# Patient Record
Sex: MALE | Race: WHITE | Employment: FULL TIME | URBAN - METROPOLITAN AREA
[De-identification: names, ages, dates, MRNs, and addresses within clinical notes are randomized per-mention and may not be internally consistent; named-entity substitution may affect disease eponyms.]

---

## 2019-06-16 ENCOUNTER — APPOINTMENT (OUTPATIENT)
Dept: GENERAL RADIOLOGY | Age: 44
End: 2019-06-16
Attending: STUDENT IN AN ORGANIZED HEALTH CARE EDUCATION/TRAINING PROGRAM
Payer: MEDICAID

## 2019-06-16 ENCOUNTER — HOSPITAL ENCOUNTER (EMERGENCY)
Age: 44
Discharge: HOME | End: 2019-06-16
Attending: STUDENT IN AN ORGANIZED HEALTH CARE EDUCATION/TRAINING PROGRAM
Payer: MEDICAID

## 2019-06-16 VITALS
BODY MASS INDEX: 31.86 KG/M2 | DIASTOLIC BLOOD PRESSURE: 96 MMHG | TEMPERATURE: 98.5 F | OXYGEN SATURATION: 93 % | HEART RATE: 96 BPM | WEIGHT: 203 LBS | HEIGHT: 67 IN | RESPIRATION RATE: 16 BRPM | SYSTOLIC BLOOD PRESSURE: 173 MMHG

## 2019-06-16 DIAGNOSIS — S39.012A LUMBAR STRAIN, INITIAL ENCOUNTER: Primary | ICD-10-CM

## 2019-06-16 PROCEDURE — 99282 EMERGENCY DEPT VISIT SF MDM: CPT

## 2019-06-16 PROCEDURE — 74011250637 HC RX REV CODE- 250/637: Performed by: STUDENT IN AN ORGANIZED HEALTH CARE EDUCATION/TRAINING PROGRAM

## 2019-06-16 PROCEDURE — 74011636637 HC RX REV CODE- 636/637: Performed by: STUDENT IN AN ORGANIZED HEALTH CARE EDUCATION/TRAINING PROGRAM

## 2019-06-16 PROCEDURE — 72100 X-RAY EXAM L-S SPINE 2/3 VWS: CPT

## 2019-06-16 RX ORDER — LORAZEPAM 0.5 MG/1
1 TABLET ORAL
Qty: 20 TAB | Refills: 0 | Status: SHIPPED | OUTPATIENT
Start: 2019-06-16 | End: 2020-02-11

## 2019-06-16 RX ORDER — LORAZEPAM 1 MG/1
1 TABLET ORAL
Status: COMPLETED | OUTPATIENT
Start: 2019-06-16 | End: 2019-06-16

## 2019-06-16 RX ORDER — PREDNISONE 50 MG/1
50 TABLET ORAL DAILY
Qty: 3 TAB | Refills: 0 | Status: SHIPPED | OUTPATIENT
Start: 2019-06-16 | End: 2019-06-19

## 2019-06-16 RX ORDER — PREDNISONE 20 MG/1
60 TABLET ORAL ONCE
Status: COMPLETED | OUTPATIENT
Start: 2019-06-16 | End: 2019-06-16

## 2019-06-16 RX ADMIN — PREDNISONE 60 MG: 20 TABLET ORAL at 14:49

## 2019-06-16 RX ADMIN — LORAZEPAM 1 MG: 1 TABLET ORAL at 14:49

## 2019-06-16 NOTE — ED PROVIDER NOTES
37 y.o. male with past medical history significant for pancreatitis, diabetes, and lyme disease who presents from home with chief complaint of back pain. Patient states ~2 days ago he was at work \"slinging tires into a truck\" when he had sudden onset of back pain. Patient was seen by a Doctor ~2 days ago, was given a shot of Tramadol and discharged home with Flexeril that provided no relief. Patient presents to [facility] ED with worsening back pain and accompanying leg pain, hip pain, leg swelling, and leg \"tingling. \" Patient endorses a hx of back problems. Patient endorses hx of GLF. Patient denies numbness. There are no other acute medical concerns at this time. Social hx: (+) Tobacco use (Former), (+) EtOH use, No illicit drug use. PCP: Afsaneh, MD Ryan    Note written by Era Cortez, as dictated by Mmoo Baron MD 1:52 PM       The history is provided by the patient. No  was used.         Past Medical History:   Diagnosis Date    Diabetes ([address]     Hernia, abdominal     Hypercholesteremia     Lyme disease     Pancreatitis        Past Surgical History:   Procedure Laterality Date    HX CHOLECYSTECTOMY           Family History:   Problem Relation Age of Onset    Cancer Mother     Diabetes Mother     Hypertension Father        Social History     Socioeconomic History    Marital status: LEGALLY      Spouse name: Not on file    Number of children: Not on file    Years of education: Not on file    Highest education level: Not on file   Occupational History    Not on file   Social Needs    Financial resource strain: Not on file    Food insecurity:     Worry: Not on file     Inability: Not on file    Transportation needs:     Medical: Not on file     Non-medical: Not on file   Tobacco Use    Smoking status: Former Smoker   Substance and Sexual Activity    Alcohol use: Yes     Comment: beer    Drug use: No    Sexual activity: Not on file   Lifestyle    Physical activity:     Days per week: Not on file     Minutes per session: Not on file    Stress: Not on file   Relationships    Social connections:     Talks on phone: Not on file     Gets together: Not on file     Attends Scientology service: Not on file     Active member of club or organization: Not on file     Attends meetings of clubs or organizations: Not on file     Relationship status: Not on file    Intimate partner violence:     Fear of current or ex partner: Not on file     Emotionally abused: Not on file     Physically abused: Not on file     Forced sexual activity: Not on file   Other Topics Concern    Not on file   Social History Narrative    Not on file         ALLERGIES: Patient has no known allergies. Review of Systems   Constitutional: Negative for chills, diaphoresis, fatigue and fever. HENT: Negative for congestion, rhinorrhea, sinus pressure, sore throat, trouble swallowing and voice change. Eyes: Negative for photophobia and visual disturbance. Respiratory: Negative for cough, chest tightness and shortness of breath. Cardiovascular: Positive for leg swelling. Negative for chest pain and palpitations. Gastrointestinal: Negative for abdominal pain, blood in stool, constipation, diarrhea, nausea and vomiting. Musculoskeletal: Positive for back pain. Negative for arthralgias, myalgias and neck pain. Leg pain. Hip pain. Neurological: Negative for dizziness, weakness, light-headedness, numbness and headaches. All other systems reviewed and are negative. There were no vitals filed for this visit. Physical Exam   Constitutional: He is oriented to person, place, and time. He appears well-developed and well-nourished. No distress. HENT:   Head: Normocephalic. Nose: Nose normal.   Mouth/Throat: Oropharynx is clear and moist. No oropharyngeal exudate. Eyes: Conjunctivae and EOM are normal. Right eye exhibits no discharge. Left eye exhibits no discharge.  No scleral icterus. Neck: Normal range of motion. Neck supple. No JVD present. No tracheal deviation present. No thyromegaly present. Cardiovascular: Normal rate, regular rhythm, normal heart sounds and intact distal pulses. Exam reveals no gallop and no friction rub. No murmur heard. Pulmonary/Chest: Effort normal and breath sounds normal. No stridor. No respiratory distress. He has no wheezes. He has no rales. He exhibits no tenderness. Abdominal: Bowel sounds are normal. He exhibits no distension and no mass. There is no tenderness. There is no rebound. Musculoskeletal: Normal range of motion. He exhibits no edema. Right upper spinal tenderness, mild L spine tenderness. Right paraspinal tenderness with palpation. Lymphadenopathy:     He has no cervical adenopathy. Neurological: He is alert and oriented to person, place, and time. No cranial nerve deficit. Coordination normal.   Skin: Skin is warm and dry. No rash noted. He is not diaphoretic. No erythema. No pallor. Psychiatric: He has a normal mood and affect. His behavior is normal. Judgment and thought content normal.   Nursing note and vitals reviewed. Note written by Era Le, as dictated by Jacques Willingham MD 1:52 PM       MDM  Number of Diagnoses or Management Options  Lumbar strain, initial encounter:   Diagnosis management comments: A/P:  Lumbar strain. 38 y/o with unremarkable PE. Pt without leg weakness or sensory changes. No red flag symptoms. Xray lumbar spine, steroids and muscle relaxers.        Amount and/or Complexity of Data Reviewed  Tests in the radiology section of CPT®: reviewed and ordered  Independent visualization of images, tracings, or specimens: yes    Risk of Complications, Morbidity, and/or Mortality  Presenting problems: low  Diagnostic procedures: low  Management options: low    Patient Progress  Patient progress: improved         Procedures

## 2019-06-16 NOTE — DISCHARGE INSTRUCTIONS

## 2019-06-16 NOTE — ED TRIAGE NOTES
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 Patient states he saw a doctor on Friday and was given flexeril without relief. Per patient pain is on left side of lower back and radiates down leg and into testicle.

## 2019-06-21 ENCOUNTER — HOSPITAL ENCOUNTER (EMERGENCY)
Age: 44
Discharge: HOME | DRG: 383 | End: 2019-06-22
Attending: EMERGENCY MEDICINE
Payer: MEDICAID

## 2019-06-21 ENCOUNTER — APPOINTMENT (OUTPATIENT)
Dept: GENERAL RADIOLOGY | Age: 44
DRG: 383 | End: 2019-06-21
Attending: EMERGENCY MEDICINE
Payer: MEDICAID

## 2019-06-21 VITALS
OXYGEN SATURATION: 99 % | DIASTOLIC BLOOD PRESSURE: 89 MMHG | RESPIRATION RATE: 18 BRPM | SYSTOLIC BLOOD PRESSURE: 151 MMHG | TEMPERATURE: 99 F | HEART RATE: 110 BPM

## 2019-06-21 DIAGNOSIS — L03.113 RIGHT ARM CELLULITIS: Primary | ICD-10-CM

## 2019-06-21 LAB
ALBUMIN SERPL-MCNC: 4.5 G/DL (ref 3.5–5)
ALBUMIN/GLOB SERPL: 1.1 {RATIO} (ref 1.1–2.2)
ALP SERPL-CCNC: 68 U/L (ref 45–117)
ALT SERPL-CCNC: 44 U/L (ref 12–78)
ANION GAP SERPL CALC-SCNC: 9 MMOL/L (ref 5–15)
AST SERPL-CCNC: 33 U/L (ref 15–37)
BASOPHILS # BLD: 0 K/UL (ref 0–0.1)
BASOPHILS NFR BLD: 0 % (ref 0–1)
BILIRUB SERPL-MCNC: 1.4 MG/DL (ref 0.2–1)
BUN SERPL-MCNC: 16 MG/DL (ref 6–20)
BUN/CREAT SERPL: 15 (ref 12–20)
CALCIUM SERPL-MCNC: 9.4 MG/DL (ref 8.5–10.1)
CHLORIDE SERPL-SCNC: 104 MMOL/L (ref 97–108)
CK SERPL-CCNC: 120 U/L (ref 39–308)
CO2 SERPL-SCNC: 23 MMOL/L (ref 21–32)
COMMENT, HOLDF: NORMAL
CREAT SERPL-MCNC: 1.1 MG/DL (ref 0.7–1.3)
DIFFERENTIAL METHOD BLD: NORMAL
EOSINOPHIL # BLD: 0.1 K/UL (ref 0–0.4)
EOSINOPHIL NFR BLD: 1 % (ref 0–7)
ERYTHROCYTE [DISTWIDTH] IN BLOOD BY AUTOMATED COUNT: 13.4 % (ref 11.5–14.5)
GLOBULIN SER CALC-MCNC: 4.2 G/DL (ref 2–4)
GLUCOSE SERPL-MCNC: 230 MG/DL (ref 65–100)
HCT VFR BLD AUTO: 45.7 % (ref 36.6–50.3)
HGB BLD-MCNC: 16 G/DL (ref 12.1–17)
IMM GRANULOCYTES # BLD AUTO: 0 K/UL (ref 0–0.04)
IMM GRANULOCYTES NFR BLD AUTO: 0 % (ref 0–0.5)
LYMPHOCYTES # BLD: 2.6 K/UL (ref 0.8–3.5)
LYMPHOCYTES NFR BLD: 28 % (ref 12–49)
MCH RBC QN AUTO: 31.9 PG (ref 26–34)
MCHC RBC AUTO-ENTMCNC: 35 G/DL (ref 30–36.5)
MCV RBC AUTO: 91.2 FL (ref 80–99)
MONOCYTES # BLD: 0.7 K/UL (ref 0–1)
MONOCYTES NFR BLD: 8 % (ref 5–13)
NEUTS SEG # BLD: 5.9 K/UL (ref 1.8–8)
NEUTS SEG NFR BLD: 63 % (ref 32–75)
NRBC # BLD: 0 K/UL (ref 0–0.01)
NRBC BLD-RTO: 0 PER 100 WBC
PLATELET # BLD AUTO: 274 K/UL (ref 150–400)
PMV BLD AUTO: 9.8 FL (ref 8.9–12.9)
POTASSIUM SERPL-SCNC: 3.5 MMOL/L (ref 3.5–5.1)
PROT SERPL-MCNC: 8.7 G/DL (ref 6.4–8.2)
RBC # BLD AUTO: 5.01 M/UL (ref 4.1–5.7)
SAMPLES BEING HELD,HOLD: NORMAL
SODIUM SERPL-SCNC: 136 MMOL/L (ref 136–145)
WBC # BLD AUTO: 9.4 K/UL (ref 4.1–11.1)

## 2019-06-21 PROCEDURE — 99282 EMERGENCY DEPT VISIT SF MDM: CPT

## 2019-06-21 PROCEDURE — 73090 X-RAY EXAM OF FOREARM: CPT

## 2019-06-21 PROCEDURE — 74011250636 HC RX REV CODE- 250/636: Performed by: EMERGENCY MEDICINE

## 2019-06-21 PROCEDURE — 96374 THER/PROPH/DIAG INJ IV PUSH: CPT

## 2019-06-21 PROCEDURE — 80053 COMPREHEN METABOLIC PANEL: CPT

## 2019-06-21 PROCEDURE — 74011250637 HC RX REV CODE- 250/637: Performed by: EMERGENCY MEDICINE

## 2019-06-21 PROCEDURE — 36415 COLL VENOUS BLD VENIPUNCTURE: CPT

## 2019-06-21 PROCEDURE — 82550 ASSAY OF CK (CPK): CPT

## 2019-06-21 PROCEDURE — 85025 COMPLETE CBC W/AUTO DIFF WBC: CPT

## 2019-06-21 RX ORDER — ACETAMINOPHEN 500 MG
1000 TABLET ORAL
Status: COMPLETED | OUTPATIENT
Start: 2019-06-21 | End: 2019-06-21

## 2019-06-21 RX ORDER — METFORMIN HYDROCHLORIDE 500 MG/1
500 TABLET ORAL 2 TIMES DAILY WITH MEALS
Qty: 60 TAB | Refills: 2 | Status: SHIPPED | OUTPATIENT
Start: 2019-06-21 | End: 2020-02-11 | Stop reason: SDUPTHER

## 2019-06-21 RX ORDER — KETOROLAC TROMETHAMINE 30 MG/ML
30 INJECTION, SOLUTION INTRAMUSCULAR; INTRAVENOUS
Status: COMPLETED | OUTPATIENT
Start: 2019-06-21 | End: 2019-06-21

## 2019-06-21 RX ORDER — NAPROXEN 500 MG/1
500 TABLET ORAL 2 TIMES DAILY WITH MEALS
Qty: 20 TAB | Refills: 0 | Status: SHIPPED | OUTPATIENT
Start: 2019-06-21 | End: 2019-07-01

## 2019-06-21 RX ORDER — DOXYCYCLINE HYCLATE 100 MG
100 TABLET ORAL 2 TIMES DAILY
Qty: 20 TAB | Refills: 0 | Status: SHIPPED | OUTPATIENT
Start: 2019-06-21 | End: 2019-06-25

## 2019-06-21 RX ADMIN — SODIUM CHLORIDE 1000 ML: 900 INJECTION, SOLUTION INTRAVENOUS at 23:23

## 2019-06-21 RX ADMIN — SODIUM CHLORIDE 1000 ML: 900 INJECTION, SOLUTION INTRAVENOUS at 23:27

## 2019-06-21 RX ADMIN — ACETAMINOPHEN 1000 MG: 500 TABLET ORAL at 23:24

## 2019-06-21 RX ADMIN — KETOROLAC TROMETHAMINE 30 MG: 30 INJECTION, SOLUTION INTRAMUSCULAR at 23:24

## 2019-06-22 PROCEDURE — 74011250637 HC RX REV CODE- 250/637: Performed by: EMERGENCY MEDICINE

## 2019-06-22 RX ORDER — DOXYCYCLINE HYCLATE 100 MG
100 TABLET ORAL
Status: COMPLETED | OUTPATIENT
Start: 2019-06-22 | End: 2019-06-22

## 2019-06-22 RX ADMIN — DOXYCYCLINE HYCLATE 100 MG: 100 TABLET, COATED ORAL at 00:26

## 2019-06-22 NOTE — ED PROVIDER NOTES
37 y.o. male with past medical history significant for DM, Pancreatitis, and HLD who presents ambulatory to the ED, with chief complaint of 6/10 proximal R forearm pain, sudden onset this 1100 hours today, with associated redness, swelling, and radiation of pain to fingers and lower bicep. 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 \" He also reports HA (2nd to lack of food today) and hx of abscesses, but denies fever/chills and known hx of MRSA. He also notes that his blood sugar readings have been in the 400s; he has not been taking his Metformin, noting that he can't afford it. There are no other acute medical concerns at this time. Negative Tobacco use (former smoker); Positive EtOH use; Negative Illicit Drug Abuse       PCP: Sophia Aguirre NP    Note written by Era Rodriguez, as dictated by Rimma Chun MD 10:03 PM     The history is provided by the patient and medical records. No  was used. Past Medical History:   Diagnosis Date    Diabetes ([address]     Hernia, abdominal     Hypercholesteremia     Lyme disease     Pancreatitis        Past Surgical History:   Procedure Laterality Date    HX CHOLECYSTECTOMY           Family History:   Problem Relation Age of Onset    Cancer Mother     Diabetes Mother     Hypertension Father        Social History     Socioeconomic History    Marital status:      Spouse name: Not on file    Number of children: Not on file    Years of education: Not on file    Highest education level: Not on file   Occupational History    Not on file   Social Needs    Financial resource strain: Not on file    Food insecurity:     Worry: Not on file     Inability: Not on file    Transportation needs:     Medical: Not on file     Non-medical: Not on file   Tobacco Use    Smoking status: Former Smoker   Substance and Sexual Activity    Alcohol use:  Yes Comment: beer    Drug use: No    Sexual activity: Not on file   Lifestyle    Physical activity:     Days per week: Not on file     Minutes per session: Not on file    Stress: Not on file   Relationships    Social connections:     Talks on phone: Not on file     Gets together: Not on file     Attends Voodoo service: Not on file     Active member of club or organization: Not on file     Attends meetings of clubs or organizations: Not on file     Relationship status: Not on file    Intimate partner violence:     Fear of current or ex partner: Not on file     Emotionally abused: Not on file     Physically abused: Not on file     Forced sexual activity: Not on file   Other Topics Concern    Not on file   Social History Narrative    Not on file         ALLERGIES: Patient has no known allergies. Review of Systems   Constitutional: Negative for appetite change and fever. HENT: Negative for congestion, nosebleeds and sore throat. Eyes: Negative for discharge. Respiratory: Negative for cough and shortness of breath. Cardiovascular: Negative for chest pain. Gastrointestinal: Negative for abdominal pain, diarrhea, nausea and vomiting. Genitourinary: Negative for dysuria. Musculoskeletal: Positive for myalgias. Skin: Positive for color change. Negative for rash. Neurological: Positive for headaches. Negative for weakness. Hematological: Negative for adenopathy. Psychiatric/Behavioral: Negative. All other systems reviewed and are negative. Vitals:    06/21/19 2001 06/21/19 2023   BP:  151/89   Pulse: (!) 122 (!) 110   Resp:  18   Temp:  99 °F (37.2 °C)   SpO2: 97% 99%            Physical Exam   Constitutional: He is oriented to person, place, and time. He appears well-developed and well-nourished. HENT:   Head: Normocephalic and atraumatic. Mouth/Throat: Oropharynx is clear and moist.   Eyes: Conjunctivae are normal.   Neck: Normal range of motion. Neck supple.    Cardiovascular: Normal rate, regular rhythm and normal heart sounds. Pulmonary/Chest: Effort normal and breath sounds normal.   Abdominal: Soft. Bowel sounds are normal. There is no tenderness. Musculoskeletal: Normal range of motion. He exhibits no edema or tenderness. Neurological: He is alert and oriented to person, place, and time. Skin: Skin is warm and dry. R forearm has a circular (4cm circumference) area that is mildly erythematous, ttp and mildly warm. No fluctuance. Psychiatric: He has a normal mood and affect. His behavior is normal.   Nursing note and vitals reviewed. Note written by Yehuda Jeans, Scribe, as dictated by Yue Kim MD 10:03 PM     MDM       Procedures       A/P:  1. Cellulitis - doxycycline. Close follow-up. Patient's results have been reviewed with them. Patient and/or family have verbally conveyed their understanding and agreement of the patient's signs, symptoms, diagnosis, treatment and prognosis and additionally agree to follow up as recommended or return to the Emergency Room should their condition change prior to follow-up. Discharge instructions have also been provided to the patient with some educational information regarding their diagnosis as well a list of reasons why they would want to return to the ER prior to their follow-up appointment should their condition change.

## 2019-06-22 NOTE — ED NOTES
Patient arrives ambulatory from home with warm, tender area to right forearm. Unsure how long its been present, but states he has a cut on same forearm.

## 2019-06-22 NOTE — DISCHARGE INSTRUCTIONS
- Doxycycline as prescribed. - Warm, wet, compresses at least four times a day. - Ibuprofen as needed for pain. You may also take Acetaminophen. - Resume your Meformin.  - Return to ED for fever >100.5F, increased pain, increased redness, increased swelling, any other concerns. Patient Education        Cellulitis: Care Instructions  Your Care Instructions    Cellulitis is a skin infection caused by bacteria, most often strep or staph. It often occurs after a break in the skin from a scrape, cut, bite, or puncture, or after a rash. Cellulitis may be treated without doing tests to find out what caused it. But your doctor may do tests, if needed, to look for a specific bacteria, like methicillin-resistant Staphylococcus aureus (MRSA). The doctor has checked you carefully, but problems can develop later. If you notice any problems or new symptoms, get medical treatment right away. Follow-up care is a key part of your treatment and safety. Be sure to make and go to all appointments, and call your doctor if you are having problems. It's also a good idea to know your test results and keep a list of the medicines you take. How can you care for yourself at home? · Take your antibiotics as directed. Do not stop taking them just because you feel better. You need to take the full course of antibiotics. · Prop up the infected area on pillows to reduce pain and swelling. Try to keep the area above the level of your heart as often as you can. · If your doctor told you how to care for your wound, follow your doctor's instructions. If you did not get instructions, follow this general advice:  ? Wash the wound with clean water 2 times a day. Don't use hydrogen peroxide or alcohol, which can slow healing. ? You may cover the wound with a thin layer of petroleum jelly, such as Vaseline, and a nonstick bandage. ? Apply more petroleum jelly and replace the bandage as needed. · Be safe with medicines.  Take pain medicines exactly as directed. ? If the doctor gave you a prescription medicine for pain, take it as prescribed. ? If you are not taking a prescription pain medicine, ask your doctor if you can take an over-the-counter medicine. To prevent cellulitis in the future  · Try to prevent cuts, scrapes, or other injuries to your skin. Cellulitis most often occurs where there is a break in the skin. · If you get a scrape, cut, mild burn, or bite, wash the wound with clean water as soon as you can to help avoid infection. Don't use hydrogen peroxide or alcohol, which can slow healing. · If you have swelling in your legs (edema), support stockings and good skin care may help prevent leg sores and cellulitis. · Take care of your feet, especially if you have diabetes or other conditions that increase the risk of infection. Wear shoes and socks. Do not go barefoot. If you have athlete's foot or other skin problems on your feet, talk to your doctor about how to treat them. When should you call for help? Call your doctor now or seek immediate medical care if:    · You have signs that your infection is getting worse, such as:  ? Increased pain, swelling, warmth, or redness. ? Red streaks leading from the area. ? Pus draining from the area. ? A fever.     · You get a rash.    Watch closely for changes in your health, and be sure to contact your doctor if:    · You do not get better as expected. Where can you learn more? Go to http://mat-norma.info/. Cheryl Daily in the search box to learn more about \"Cellulitis: Care Instructions. \"  Current as of: April 17, 2018  Content Version: 11.9  © 3469-0451 [employer]. Care instructions adapted under license by [employer] (which disclaims liability or warranty for this information).  If you have questions about a medical condition or this instruction, always ask your healthcare professional. [address] any warranty or liability for your use of this information.

## 2019-06-23 ENCOUNTER — APPOINTMENT (OUTPATIENT)
Dept: CT IMAGING | Age: 44
DRG: 383 | End: 2019-06-23
Attending: EMERGENCY MEDICINE
Payer: MEDICAID

## 2019-06-23 ENCOUNTER — HOSPITAL ENCOUNTER (INPATIENT)
Age: 44
LOS: 2 days | Discharge: HOME | DRG: 383 | End: 2019-06-25
Attending: EMERGENCY MEDICINE | Admitting: INTERNAL MEDICINE
Payer: MEDICAID

## 2019-06-23 DIAGNOSIS — L03.113 CELLULITIS OF RIGHT UPPER EXTREMITY: Primary | ICD-10-CM

## 2019-06-23 PROBLEM — L03.90 CELLULITIS: Status: ACTIVE | Noted: 2019-06-23

## 2019-06-23 LAB
ANION GAP SERPL CALC-SCNC: 7 MMOL/L (ref 5–15)
BASOPHILS # BLD: 0.1 K/UL (ref 0–0.1)
BASOPHILS NFR BLD: 1 % (ref 0–1)
BUN SERPL-MCNC: 9 MG/DL (ref 6–20)
BUN/CREAT SERPL: 10 (ref 12–20)
CALCIUM SERPL-MCNC: 9.5 MG/DL (ref 8.5–10.1)
CHLORIDE SERPL-SCNC: 103 MMOL/L (ref 97–108)
CO2 SERPL-SCNC: 28 MMOL/L (ref 21–32)
COMMENT, HOLDF: NORMAL
CREAT SERPL-MCNC: 0.93 MG/DL (ref 0.7–1.3)
DIFFERENTIAL METHOD BLD: NORMAL
EOSINOPHIL # BLD: 0.1 K/UL (ref 0–0.4)
EOSINOPHIL NFR BLD: 2 % (ref 0–7)
ERYTHROCYTE [DISTWIDTH] IN BLOOD BY AUTOMATED COUNT: 13.2 % (ref 11.5–14.5)
GLUCOSE SERPL-MCNC: 228 MG/DL (ref 65–100)
HCT VFR BLD AUTO: 41 % (ref 36.6–50.3)
HGB BLD-MCNC: 14.3 G/DL (ref 12.1–17)
IMM GRANULOCYTES # BLD AUTO: 0 K/UL (ref 0–0.04)
IMM GRANULOCYTES NFR BLD AUTO: 0 % (ref 0–0.5)
LYMPHOCYTES # BLD: 2.5 K/UL (ref 0.8–3.5)
LYMPHOCYTES NFR BLD: 44 % (ref 12–49)
MCH RBC QN AUTO: 31.8 PG (ref 26–34)
MCHC RBC AUTO-ENTMCNC: 34.9 G/DL (ref 30–36.5)
MCV RBC AUTO: 91.1 FL (ref 80–99)
MONOCYTES # BLD: 0.4 K/UL (ref 0–1)
MONOCYTES NFR BLD: 7 % (ref 5–13)
NEUTS SEG # BLD: 2.6 K/UL (ref 1.8–8)
NEUTS SEG NFR BLD: 46 % (ref 32–75)
NRBC # BLD: 0 K/UL (ref 0–0.01)
NRBC BLD-RTO: 0 PER 100 WBC
PLATELET # BLD AUTO: 231 K/UL (ref 150–400)
PMV BLD AUTO: 10.2 FL (ref 8.9–12.9)
POTASSIUM SERPL-SCNC: 4.1 MMOL/L (ref 3.5–5.1)
RBC # BLD AUTO: 4.5 M/UL (ref 4.1–5.7)
SAMPLES BEING HELD,HOLD: NORMAL
SODIUM SERPL-SCNC: 138 MMOL/L (ref 136–145)
WBC # BLD AUTO: 5.7 K/UL (ref 4.1–11.1)

## 2019-06-23 PROCEDURE — 74011250636 HC RX REV CODE- 250/636: Performed by: EMERGENCY MEDICINE

## 2019-06-23 PROCEDURE — 74011636320 HC RX REV CODE- 636/320

## 2019-06-23 PROCEDURE — 74011250636 HC RX REV CODE- 250/636

## 2019-06-23 PROCEDURE — 80048 BASIC METABOLIC PNL TOTAL CA: CPT

## 2019-06-23 PROCEDURE — 99283 EMERGENCY DEPT VISIT LOW MDM: CPT

## 2019-06-23 PROCEDURE — 65270000029 HC RM PRIVATE

## 2019-06-23 PROCEDURE — 36415 COLL VENOUS BLD VENIPUNCTURE: CPT

## 2019-06-23 PROCEDURE — 74011000258 HC RX REV CODE- 258

## 2019-06-23 PROCEDURE — 85025 COMPLETE CBC W/AUTO DIFF WBC: CPT

## 2019-06-23 PROCEDURE — 73201 CT UPPER EXTREMITY W/DYE: CPT

## 2019-06-23 RX ORDER — ACETAMINOPHEN 325 MG/1
650 TABLET ORAL
Status: DISCONTINUED | OUTPATIENT
Start: 2019-06-23 | End: 2019-06-25 | Stop reason: HOSPADM

## 2019-06-23 RX ORDER — KETOROLAC TROMETHAMINE 30 MG/ML
15 INJECTION, SOLUTION INTRAMUSCULAR; INTRAVENOUS
Status: DISCONTINUED | OUTPATIENT
Start: 2019-06-23 | End: 2019-06-25 | Stop reason: HOSPADM

## 2019-06-23 RX ORDER — VANCOMYCIN 2 GRAM/500 ML IN 0.9 % SODIUM CHLORIDE INTRAVENOUS
2000
Status: COMPLETED | OUTPATIENT
Start: 2019-06-23 | End: 2019-06-23

## 2019-06-23 RX ORDER — SODIUM CHLORIDE 9 MG/ML
1000 INJECTION, SOLUTION INTRAVENOUS ONCE
Status: COMPLETED | OUTPATIENT
Start: 2019-06-23 | End: 2019-06-23

## 2019-06-23 RX ORDER — SODIUM CHLORIDE, SODIUM LACTATE, POTASSIUM CHLORIDE, CALCIUM CHLORIDE 600; 310; 30; 20 MG/100ML; MG/100ML; MG/100ML; MG/100ML
100 INJECTION, SOLUTION INTRAVENOUS CONTINUOUS
Status: DISCONTINUED | OUTPATIENT
Start: 2019-06-23 | End: 2019-06-25 | Stop reason: HOSPADM

## 2019-06-23 RX ORDER — VANCOMYCIN/0.9 % SOD CHLORIDE 1.5G/250ML
1500 PLASTIC BAG, INJECTION (ML) INTRAVENOUS EVERY 12 HOURS
Status: DISCONTINUED | OUTPATIENT
Start: 2019-06-24 | End: 2019-06-25 | Stop reason: HOSPADM

## 2019-06-23 RX ORDER — SODIUM CHLORIDE 0.9 % (FLUSH) 0.9 %
10 SYRINGE (ML) INJECTION
Status: COMPLETED | OUTPATIENT
Start: 2019-06-23 | End: 2019-06-23

## 2019-06-23 RX ORDER — KETOROLAC TROMETHAMINE 30 MG/ML
15 INJECTION, SOLUTION INTRAMUSCULAR; INTRAVENOUS
Status: COMPLETED | OUTPATIENT
Start: 2019-06-23 | End: 2019-06-23

## 2019-06-23 RX ORDER — HYDROMORPHONE HYDROCHLORIDE 1 MG/ML
1 INJECTION, SOLUTION INTRAMUSCULAR; INTRAVENOUS; SUBCUTANEOUS
Status: COMPLETED | OUTPATIENT
Start: 2019-06-23 | End: 2019-06-23

## 2019-06-23 RX ORDER — HYDROMORPHONE HYDROCHLORIDE 1 MG/ML
1 INJECTION, SOLUTION INTRAMUSCULAR; INTRAVENOUS; SUBCUTANEOUS
Status: DISCONTINUED | OUTPATIENT
Start: 2019-06-23 | End: 2019-06-25 | Stop reason: HOSPADM

## 2019-06-23 RX ORDER — ONDANSETRON 2 MG/ML
INJECTION INTRAMUSCULAR; INTRAVENOUS
Status: COMPLETED
Start: 2019-06-23 | End: 2019-06-23

## 2019-06-23 RX ORDER — SODIUM CHLORIDE 0.9 % (FLUSH) 0.9 %
SYRINGE (ML) INJECTION
Status: COMPLETED
Start: 2019-06-23 | End: 2019-06-23

## 2019-06-23 RX ORDER — ONDANSETRON 2 MG/ML
4 INJECTION INTRAMUSCULAR; INTRAVENOUS
Status: COMPLETED | OUTPATIENT
Start: 2019-06-23 | End: 2019-06-23

## 2019-06-23 RX ORDER — SODIUM CHLORIDE 9 MG/ML
INJECTION, SOLUTION INTRAVENOUS
Status: COMPLETED
Start: 2019-06-23 | End: 2019-06-23

## 2019-06-23 RX ADMIN — KETOROLAC TROMETHAMINE 15 MG: 30 INJECTION, SOLUTION INTRAMUSCULAR at 21:54

## 2019-06-23 RX ADMIN — HYDROMORPHONE HYDROCHLORIDE 1 MG: 1 INJECTION, SOLUTION INTRAMUSCULAR; INTRAVENOUS; SUBCUTANEOUS at 21:54

## 2019-06-23 RX ADMIN — ONDANSETRON 4 MG: 2 INJECTION INTRAMUSCULAR; INTRAVENOUS at 22:01

## 2019-06-23 RX ADMIN — Medication 10 ML: at 21:36

## 2019-06-23 RX ADMIN — SODIUM CHLORIDE 100 ML: 900 INJECTION, SOLUTION INTRAVENOUS at 21:48

## 2019-06-23 RX ADMIN — IOPAMIDOL 100 ML: 612 INJECTION, SOLUTION INTRAVENOUS at 21:36

## 2019-06-23 RX ADMIN — VANCOMYCIN HYDROCHLORIDE 2000 MG: 10 INJECTION, POWDER, LYOPHILIZED, FOR SOLUTION INTRAVENOUS at 21:45

## 2019-06-23 RX ADMIN — SODIUM CHLORIDE 1000 ML: 900 INJECTION, SOLUTION INTRAVENOUS at 21:54

## 2019-06-24 ENCOUNTER — APPOINTMENT (OUTPATIENT)
Dept: VASCULAR SURGERY | Age: 44
DRG: 383 | End: 2019-06-24
Attending: INTERNAL MEDICINE
Payer: MEDICAID

## 2019-06-24 LAB
AMPHET UR QL SCN: NEGATIVE
ANION GAP SERPL CALC-SCNC: 8 MMOL/L (ref 5–15)
BARBITURATES UR QL SCN: NEGATIVE
BASOPHILS # BLD: 0 K/UL (ref 0–0.1)
BASOPHILS NFR BLD: 0 % (ref 0–1)
BENZODIAZ UR QL: POSITIVE
BUN SERPL-MCNC: 9 MG/DL (ref 6–20)
BUN/CREAT SERPL: 12 (ref 12–20)
CALCIUM SERPL-MCNC: 8.5 MG/DL (ref 8.5–10.1)
CANNABINOIDS UR QL SCN: NEGATIVE
CHLORIDE SERPL-SCNC: 106 MMOL/L (ref 97–108)
CHOLEST SERPL-MCNC: 123 MG/DL
CO2 SERPL-SCNC: 24 MMOL/L (ref 21–32)
COCAINE UR QL SCN: NEGATIVE
COMMENT, HOLDF: NORMAL
COMMENT, HOLDF: NORMAL
CREAT SERPL-MCNC: 0.75 MG/DL (ref 0.7–1.3)
CRP SERPL-MCNC: 0.97 MG/DL (ref 0–0.6)
DIFFERENTIAL METHOD BLD: ABNORMAL
DRUG SCRN COMMENT,DRGCM: ABNORMAL
EOSINOPHIL # BLD: 0.1 K/UL (ref 0–0.4)
EOSINOPHIL NFR BLD: 2 % (ref 0–7)
ERYTHROCYTE [DISTWIDTH] IN BLOOD BY AUTOMATED COUNT: 12.9 % (ref 11.5–14.5)
ERYTHROCYTE [SEDIMENTATION RATE] IN BLOOD: 15 MM/HR (ref 0–15)
EST. AVERAGE GLUCOSE BLD GHB EST-MCNC: 174 MG/DL
GLUCOSE BLD STRIP.AUTO-MCNC: 153 MG/DL (ref 65–100)
GLUCOSE BLD STRIP.AUTO-MCNC: 164 MG/DL (ref 65–100)
GLUCOSE BLD STRIP.AUTO-MCNC: 198 MG/DL (ref 65–100)
GLUCOSE BLD STRIP.AUTO-MCNC: 214 MG/DL (ref 65–100)
GLUCOSE SERPL-MCNC: 207 MG/DL (ref 65–100)
HBA1C MFR BLD: 7.7 % (ref 4.2–6.3)
HCT VFR BLD AUTO: 36.4 % (ref 36.6–50.3)
HDLC SERPL-MCNC: 31 MG/DL
HDLC SERPL: 4 {RATIO} (ref 0–5)
HGB BLD-MCNC: 12.7 G/DL (ref 12.1–17)
IMM GRANULOCYTES # BLD AUTO: 0 K/UL (ref 0–0.04)
IMM GRANULOCYTES NFR BLD AUTO: 0 % (ref 0–0.5)
LDLC SERPL CALC-MCNC: 31.2 MG/DL (ref 0–100)
LIPID PROFILE,FLP: ABNORMAL
LYMPHOCYTES # BLD: 2 K/UL (ref 0.8–3.5)
LYMPHOCYTES NFR BLD: 38 % (ref 12–49)
MAGNESIUM SERPL-MCNC: 1.8 MG/DL (ref 1.6–2.4)
MCH RBC QN AUTO: 32.2 PG (ref 26–34)
MCHC RBC AUTO-ENTMCNC: 34.9 G/DL (ref 30–36.5)
MCV RBC AUTO: 92.2 FL (ref 80–99)
METHADONE UR QL: NEGATIVE
MONOCYTES # BLD: 0.3 K/UL (ref 0–1)
MONOCYTES NFR BLD: 5 % (ref 5–13)
NEUTS SEG # BLD: 2.9 K/UL (ref 1.8–8)
NEUTS SEG NFR BLD: 55 % (ref 32–75)
NRBC # BLD: 0 K/UL (ref 0–0.01)
NRBC BLD-RTO: 0 PER 100 WBC
OPIATES UR QL: POSITIVE
PCP UR QL: NEGATIVE
PHOSPHATE SERPL-MCNC: 3.7 MG/DL (ref 2.6–4.7)
PLATELET # BLD AUTO: 194 K/UL (ref 150–400)
PMV BLD AUTO: 10 FL (ref 8.9–12.9)
POTASSIUM SERPL-SCNC: 3.8 MMOL/L (ref 3.5–5.1)
RBC # BLD AUTO: 3.95 M/UL (ref 4.1–5.7)
SAMPLES BEING HELD,HOLD: NORMAL
SAMPLES BEING HELD,HOLD: NORMAL
SERVICE CMNT-IMP: ABNORMAL
SODIUM SERPL-SCNC: 138 MMOL/L (ref 136–145)
TRIGL SERPL-MCNC: 304 MG/DL (ref ?–150)
VLDLC SERPL CALC-MCNC: 60.8 MG/DL
WBC # BLD AUTO: 5.2 K/UL (ref 4.1–11.1)

## 2019-06-24 PROCEDURE — 74011000258 HC RX REV CODE- 258: Performed by: INTERNAL MEDICINE

## 2019-06-24 PROCEDURE — 80307 DRUG TEST PRSMV CHEM ANLYZR: CPT

## 2019-06-24 PROCEDURE — 74011636637 HC RX REV CODE- 636/637: Performed by: INTERNAL MEDICINE

## 2019-06-24 PROCEDURE — 83036 HEMOGLOBIN GLYCOSYLATED A1C: CPT

## 2019-06-24 PROCEDURE — 36415 COLL VENOUS BLD VENIPUNCTURE: CPT

## 2019-06-24 PROCEDURE — 65270000032 HC RM SEMIPRIVATE

## 2019-06-24 PROCEDURE — 74011250636 HC RX REV CODE- 250/636: Performed by: INTERNAL MEDICINE

## 2019-06-24 PROCEDURE — 74011250636 HC RX REV CODE- 250/636: Performed by: FAMILY MEDICINE

## 2019-06-24 PROCEDURE — 85652 RBC SED RATE AUTOMATED: CPT

## 2019-06-24 PROCEDURE — 86140 C-REACTIVE PROTEIN: CPT

## 2019-06-24 PROCEDURE — 85025 COMPLETE CBC W/AUTO DIFF WBC: CPT

## 2019-06-24 PROCEDURE — 80061 LIPID PANEL: CPT

## 2019-06-24 PROCEDURE — 82962 GLUCOSE BLOOD TEST: CPT

## 2019-06-24 PROCEDURE — 84100 ASSAY OF PHOSPHORUS: CPT

## 2019-06-24 PROCEDURE — 74011250637 HC RX REV CODE- 250/637: Performed by: FAMILY MEDICINE

## 2019-06-24 PROCEDURE — 80048 BASIC METABOLIC PNL TOTAL CA: CPT

## 2019-06-24 PROCEDURE — 74011250637 HC RX REV CODE- 250/637: Performed by: INTERNAL MEDICINE

## 2019-06-24 PROCEDURE — 93971 EXTREMITY STUDY: CPT

## 2019-06-24 PROCEDURE — 83735 ASSAY OF MAGNESIUM: CPT

## 2019-06-24 RX ORDER — IBUPROFEN 200 MG
1 TABLET ORAL EVERY 24 HOURS
Status: DISCONTINUED | OUTPATIENT
Start: 2019-06-24 | End: 2019-06-25 | Stop reason: HOSPADM

## 2019-06-24 RX ORDER — HEPARIN SODIUM 5000 [USP'U]/ML
5000 INJECTION, SOLUTION INTRAVENOUS; SUBCUTANEOUS EVERY 8 HOURS
Status: DISCONTINUED | OUTPATIENT
Start: 2019-06-24 | End: 2019-06-25 | Stop reason: HOSPADM

## 2019-06-24 RX ORDER — INSULIN LISPRO 100 [IU]/ML
INJECTION, SOLUTION INTRAVENOUS; SUBCUTANEOUS
Status: DISCONTINUED | OUTPATIENT
Start: 2019-06-24 | End: 2019-06-25 | Stop reason: HOSPADM

## 2019-06-24 RX ORDER — BISACODYL 5 MG
5 TABLET, DELAYED RELEASE (ENTERIC COATED) ORAL DAILY PRN
Status: DISCONTINUED | OUTPATIENT
Start: 2019-06-24 | End: 2019-06-25 | Stop reason: HOSPADM

## 2019-06-24 RX ORDER — LORAZEPAM 0.5 MG/1
1 TABLET ORAL
Status: DISCONTINUED | OUTPATIENT
Start: 2019-06-24 | End: 2019-06-25 | Stop reason: HOSPADM

## 2019-06-24 RX ORDER — NALOXONE HYDROCHLORIDE 0.4 MG/ML
0.4 INJECTION, SOLUTION INTRAMUSCULAR; INTRAVENOUS; SUBCUTANEOUS AS NEEDED
Status: DISCONTINUED | OUTPATIENT
Start: 2019-06-24 | End: 2019-06-25 | Stop reason: HOSPADM

## 2019-06-24 RX ORDER — MAGNESIUM SULFATE 100 %
4 CRYSTALS MISCELLANEOUS AS NEEDED
Status: DISCONTINUED | OUTPATIENT
Start: 2019-06-24 | End: 2019-06-25 | Stop reason: HOSPADM

## 2019-06-24 RX ORDER — LISINOPRIL 10 MG/1
10 TABLET ORAL DAILY
Status: DISCONTINUED | OUTPATIENT
Start: 2019-06-24 | End: 2019-06-25 | Stop reason: HOSPADM

## 2019-06-24 RX ORDER — ATORVASTATIN CALCIUM 40 MG/1
40 TABLET, FILM COATED ORAL
Status: DISCONTINUED | OUTPATIENT
Start: 2019-06-24 | End: 2019-06-25 | Stop reason: HOSPADM

## 2019-06-24 RX ORDER — SODIUM CHLORIDE 0.9 % (FLUSH) 0.9 %
5-40 SYRINGE (ML) INJECTION EVERY 8 HOURS
Status: DISCONTINUED | OUTPATIENT
Start: 2019-06-24 | End: 2019-06-25 | Stop reason: HOSPADM

## 2019-06-24 RX ORDER — ATORVASTATIN CALCIUM 40 MG/1
40 TABLET, FILM COATED ORAL
Status: DISCONTINUED | OUTPATIENT
Start: 2019-06-24 | End: 2019-06-24

## 2019-06-24 RX ORDER — ONDANSETRON 2 MG/ML
4 INJECTION INTRAMUSCULAR; INTRAVENOUS
Status: DISCONTINUED | OUTPATIENT
Start: 2019-06-24 | End: 2019-06-25 | Stop reason: HOSPADM

## 2019-06-24 RX ORDER — DEXTROSE 50 % IN WATER (D50W) INTRAVENOUS SYRINGE
12.5-25 AS NEEDED
Status: DISCONTINUED | OUTPATIENT
Start: 2019-06-24 | End: 2019-06-25 | Stop reason: HOSPADM

## 2019-06-24 RX ORDER — SODIUM CHLORIDE 0.9 % (FLUSH) 0.9 %
5-40 SYRINGE (ML) INJECTION AS NEEDED
Status: DISCONTINUED | OUTPATIENT
Start: 2019-06-24 | End: 2019-06-25 | Stop reason: HOSPADM

## 2019-06-24 RX ORDER — SODIUM CHLORIDE 0.9 % (FLUSH) 0.9 %
SYRINGE (ML) INJECTION
Status: COMPLETED
Start: 2019-06-24 | End: 2019-06-24

## 2019-06-24 RX ADMIN — HYDROMORPHONE HYDROCHLORIDE 1 MG: 1 INJECTION, SOLUTION INTRAMUSCULAR; INTRAVENOUS; SUBCUTANEOUS at 16:15

## 2019-06-24 RX ADMIN — SODIUM CHLORIDE 3 G: 900 INJECTION, SOLUTION INTRAVENOUS at 00:00

## 2019-06-24 RX ADMIN — Medication: at 11:00

## 2019-06-24 RX ADMIN — INSULIN LISPRO 2 UNITS: 100 INJECTION, SOLUTION INTRAVENOUS; SUBCUTANEOUS at 17:54

## 2019-06-24 RX ADMIN — Medication 10 ML: at 06:01

## 2019-06-24 RX ADMIN — Medication 10 ML: at 14:50

## 2019-06-24 RX ADMIN — KETOROLAC TROMETHAMINE 15 MG: 30 INJECTION, SOLUTION INTRAMUSCULAR at 10:14

## 2019-06-24 RX ADMIN — Medication 10 ML: at 02:22

## 2019-06-24 RX ADMIN — SODIUM CHLORIDE 3 G: 900 INJECTION, SOLUTION INTRAVENOUS at 17:55

## 2019-06-24 RX ADMIN — SODIUM CHLORIDE, SODIUM LACTATE, POTASSIUM CHLORIDE, AND CALCIUM CHLORIDE 100 ML/HR: 600; 310; 30; 20 INJECTION, SOLUTION INTRAVENOUS at 14:49

## 2019-06-24 RX ADMIN — HEPARIN SODIUM 5000 UNITS: 5000 INJECTION INTRAVENOUS; SUBCUTANEOUS at 09:58

## 2019-06-24 RX ADMIN — SODIUM CHLORIDE, SODIUM LACTATE, POTASSIUM CHLORIDE, AND CALCIUM CHLORIDE 100 ML/HR: 600; 310; 30; 20 INJECTION, SOLUTION INTRAVENOUS at 00:03

## 2019-06-24 RX ADMIN — HYDROMORPHONE HYDROCHLORIDE 1 MG: 1 INJECTION, SOLUTION INTRAMUSCULAR; INTRAVENOUS; SUBCUTANEOUS at 06:49

## 2019-06-24 RX ADMIN — Medication 1 CAPSULE: at 09:58

## 2019-06-24 RX ADMIN — VANCOMYCIN HYDROCHLORIDE 1500 MG: 10 INJECTION, POWDER, LYOPHILIZED, FOR SOLUTION INTRAVENOUS at 11:32

## 2019-06-24 RX ADMIN — HEPARIN SODIUM 5000 UNITS: 5000 INJECTION INTRAVENOUS; SUBCUTANEOUS at 17:54

## 2019-06-24 RX ADMIN — INSULIN LISPRO 3 UNITS: 100 INJECTION, SOLUTION INTRAVENOUS; SUBCUTANEOUS at 07:19

## 2019-06-24 RX ADMIN — SODIUM CHLORIDE 3 G: 900 INJECTION, SOLUTION INTRAVENOUS at 10:00

## 2019-06-24 RX ADMIN — VANCOMYCIN HYDROCHLORIDE 1500 MG: 10 INJECTION, POWDER, LYOPHILIZED, FOR SOLUTION INTRAVENOUS at 21:11

## 2019-06-24 RX ADMIN — HYDROMORPHONE HYDROCHLORIDE 1 MG: 1 INJECTION, SOLUTION INTRAMUSCULAR; INTRAVENOUS; SUBCUTANEOUS at 02:27

## 2019-06-24 RX ADMIN — HEPARIN SODIUM 5000 UNITS: 5000 INJECTION INTRAVENOUS; SUBCUTANEOUS at 02:28

## 2019-06-24 RX ADMIN — KETOROLAC TROMETHAMINE 15 MG: 30 INJECTION, SOLUTION INTRAMUSCULAR at 21:23

## 2019-06-24 RX ADMIN — SODIUM CHLORIDE 3 G: 900 INJECTION, SOLUTION INTRAVENOUS at 23:00

## 2019-06-24 RX ADMIN — LISINOPRIL 10 MG: 10 TABLET ORAL at 09:58

## 2019-06-24 RX ADMIN — Medication 10 ML: at 22:00

## 2019-06-24 RX ADMIN — KETOROLAC TROMETHAMINE 15 MG: 30 INJECTION, SOLUTION INTRAMUSCULAR at 00:04

## 2019-06-24 RX ADMIN — HYDROMORPHONE HYDROCHLORIDE 1 MG: 1 INJECTION, SOLUTION INTRAMUSCULAR; INTRAVENOUS; SUBCUTANEOUS at 20:26

## 2019-06-24 RX ADMIN — HYDROMORPHONE HYDROCHLORIDE 1 MG: 1 INJECTION, SOLUTION INTRAMUSCULAR; INTRAVENOUS; SUBCUTANEOUS at 11:32

## 2019-06-24 RX ADMIN — SODIUM CHLORIDE 3 G: 900 INJECTION, SOLUTION INTRAVENOUS at 06:00

## 2019-06-24 RX ADMIN — LORAZEPAM 1 MG: 0.5 TABLET ORAL at 02:33

## 2019-06-24 RX ADMIN — ATORVASTATIN CALCIUM 40 MG: 40 TABLET, FILM COATED ORAL at 21:15

## 2019-06-24 NOTE — DIABETES MGMT
[facility]    [facility] Consult Note    Recommendations/ Comments: Blood sugars elevated and often > 200 mg/dL. If appropriate, please consider:   - addition of Glimepiride 1 mg with breakfast    Current hospital DM medication: Lispro correction, normal sensitivity    Consult received for:  [x]             Assessment of home management     [x]             Outpatient education    Chart reviewed and initial evaluation complete on [address] .    Patient is a 37 y.o. male with known Type 2 Diabetes on oral agent (monotherapy): metformin (generic) at home. He reports that he avoids concentrated sweets most of the time. He has not monitored for a few years, so a meter was provided and patient was instructed on use. He declined outpatient education, but agreed to speak with PCP regarding blood sugar results if above target. Assessed and instructed patient on the following:   ·  interpretation of lab results, blood sugar goals, complications of diabetes mellitus, SMBG skills and nutrition    Encouraged the following:   · regular blood sugar monitorin times daily    Provided patient with the following: [x]             Diabetes Self Care Guide               [x]             Outpatient DTC contact number    Glucometer: Contour Next One    Discussed with patient and/or family need for follow up appointment for diabetes management after discharge. A1c:   Lab Results   Component Value Date/Time    Hemoglobin A1c 7.7 (H) 2019 05:00 AM       Recent Glucose Results:   Lab Results   Component Value Date/Time     (H) 2019 04:45 AM     (H) 2019 09:24 PM    GLUCPOC 198 (H) 2019 11:49 AM    GLUCPOC 214 (H) 2019 06:08 AM        Lab Results   Component Value Date/Time    Creatinine 0.75 2019 04:45 AM     Estimated Creatinine Clearance: 136.3 mL/min (based on SCr of 0.75 mg/dL).     Active Orders   Diet    DIET DIABETIC CONSISTENT CARB Regular; 2 GM NA (House Low NA)        PO intake: No data found. Will continue to follow as needed. Thank you.   Gigi Yousif MS, RN, CDE    Time spent: 25 minutes

## 2019-06-24 NOTE — PROGRESS NOTES
TRANSFER - IN REPORT:    Verbal report received from Leonel Garcia on [address]  being received from ED for routine progression of care      Report consisted of patients Situation, Background, Assessment and   Recommendations(SBAR). Information from the following report(s) Kardex, ED Summary and Recent Results was reviewed with the receiving nurse. Opportunity for questions and clarification was provided. Assessment completed upon patients arrival to unit and care assumed.

## 2019-06-24 NOTE — PROGRESS NOTES
The patient is receiving Vancomycin, right upper arm is being elevated, the patient will receive pain management and antibiotics while here, & no new issues at this time.

## 2019-06-24 NOTE — ED PROVIDER NOTES
37 y.o. male with past medical history significant for DM, hypercholesterolemia, h/o Lyme disease, presents ambulatory to the ED accompanied by a family member with chief complaint of worsening right arm redness since prior ED evaluation two days ago. Patient states that he felt the onset of right forearm pain on Thursday afternoon, 6/20/19. 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 After the arm pain started, he also noticed the onset of right forearm redness and swelling. He was seen in the ED on 6/21/19 for initial evaluation of his symptoms. At that time he was diagnosed with right arm cellulitis and was prescribed doxycycline for treatment. He states that he has been taking the doxycyline as prescribed, and he took his dose today as directed. Despite taking the abx, his right arm redness has worsened and spread more circumferentially around the forearm. Patient also notes that his right forearm pain has persisted. The pain is constant, but exacerbated with movement, and he rates his current level of discomfort as a 9/10 in severity. The right arm swelling has worsened, and he additionally complains of a headache, subjective fevers, and diaphoresis throughout the night last night. Did not take his temperature at home. Patient specifically denies chest pain, shortness of breath, nausea, and vomiting. There are no other acute medical concerns at this time. Social hx: Denies current Tobacco use (former smoker); Positive EtOH use; Denies Illicit Drug Abuse    PCP: Christian Olivas NP    Note written by Obdulio Negrete. Fabian Lou, as dictated by Algernon Saint, PA-C 9:14 PM     The history is provided by the patient and medical records. No  was used.         Past Medical History:   Diagnosis Date    Diabetes ([address]     Hernia, abdominal     Hypercholesteremia     Lyme disease     Pancreatitis        Past Surgical History:   Procedure Laterality Date    HX CHOLECYSTECTOMY           Family History:   Problem Relation Age of Onset    Cancer Mother     Diabetes Mother     Hypertension Father        Social History     Socioeconomic History    Marital status:      Spouse name: Not on file    Number of children: Not on file    Years of education: Not on file    Highest education level: Not on file   Occupational History    Not on file   Social Needs    Financial resource strain: Not on file    Food insecurity:     Worry: Not on file     Inability: Not on file    Transportation needs:     Medical: Not on file     Non-medical: Not on file   Tobacco Use    Smoking status: Former Smoker   Substance and Sexual Activity    Alcohol use: Yes     Comment: beer    Drug use: No    Sexual activity: Not on file   Lifestyle    Physical activity:     Days per week: Not on file     Minutes per session: Not on file    Stress: Not on file   Relationships    Social connections:     Talks on phone: Not on file     Gets together: Not on file     Attends Christian service: Not on file     Active member of club or organization: Not on file     Attends meetings of clubs or organizations: Not on file     Relationship status: Not on file    Intimate partner violence:     Fear of current or ex partner: Not on file     Emotionally abused: Not on file     Physically abused: Not on file     Forced sexual activity: Not on file   Other Topics Concern    Not on file   Social History Narrative    Not on file         ALLERGIES: Patient has no known allergies. Review of Systems   Constitutional: Positive for diaphoresis and fever (subjective). Respiratory: Negative for shortness of breath. Cardiovascular: Negative for chest pain. Gastrointestinal: Negative for nausea and vomiting. Musculoskeletal: Positive for myalgias (right arm).         + Right arm swelling   Skin: Positive for color change (right arm). Neurological: Positive for headaches. All other systems reviewed and are negative. Vitals:    06/23/19 2004   BP: 156/78   Pulse: 100   Resp: 16   Temp: 98.2 °F (36.8 °C)   SpO2: 99%   Weight: 98 kg (216 lb 0.8 oz)   Height: 5' 6\" (1.676 m)            Physical Exam   Constitutional: He is oriented to person, place, and time. He appears well-developed and well-nourished. HENT:   Head: Normocephalic and atraumatic. Eyes: Pupils are equal, round, and reactive to light. Conjunctivae and EOM are normal.   Neck: Normal range of motion. Neck supple. Cardiovascular: Normal rate and regular rhythm. Pulmonary/Chest: Effort normal and breath sounds normal. No respiratory distress. Abdominal: Soft. He exhibits no distension. Musculoskeletal: Normal range of motion. Right forearm:  + erythema and warmth  Tender to palpation  Proximal forearm. 3 cm tissue swelling  Painful flexion of wrist  5/5   strength  2+ radial pulse  nv intact  Cap refill 3 seconds  Compartments soft. Neurological: He is alert and oriented to person, place, and time. Skin: Skin is warm. There is erythema. Nursing note and vitals reviewed. MDM  Number of Diagnoses or Management Options  Cellulitis of right upper extremity:   Diagnosis management comments: 36 yo male presents to ER for evaluation of increasing right arm redness and infection. On doxycycline. Not improving. No fever. P: labs, CT, iv fluid, pain control, antibiotics      10:18 PM  I have spoken with hospitalist Dr. Quintin Veliz. Discussed patient's presentation, history, physical assessment, and available diagnostic results. They will write orders and admit the patient to the hospital. All available results have been reviewed with the patient and any available family. Care plan has been outlined and questions have been answered.  Patient and any available family understands and agrees to need for admission to hospital for further treatment not available in ED. Patient is ready for admission. Procedures      PROGRESS NOTE:  9:23 PM   Discussed case with attending physician, Brigette Rodríguez MD.        Hospitalist Opal for Admission  10:11 PM    ED Room Number: R17/R41  Patient Name and age:  Miguelito Kramer. 37 y.o.  male  Working Diagnosis:   1. Cellulitis of right upper extremity      Readmission: no  Isolation Requirements:  no  Recommended Level of Care:  med/surg  Code Status:  full  Other:  Cellulitis right forearm. Seen 3 days ago. Worsening on doxycycline. Prelim CT negative. Getting dose of vancomycin      Pt case including HPI, PE, and all available lab and radiology results has been discussed with attending physician. Opportunity to evaluate patient has been provided to ER attending.

## 2019-06-24 NOTE — PROGRESS NOTES
Reason for Admission:    Right arm redness and pain.                      RRAT Score:     6                Plan for utilizing home health:      None                     Current Advanced Directive/Advance Care Plan: Not interested                          Transition of Care Plan:                      Pt is independent and doesn't need any support. CM spoke to the pt at the bedside, the only support pt requesting is to have his prescription filled at outpatient pharmacy before going home. Pt stated that he had problem filling out prescriptions at his pharmacy ([facility]) last time due to medicaid ID number mismatch. No additional barriers identified.      KATHERINE Welch, [facility]  [phone]

## 2019-06-24 NOTE — ROUTINE PROCESS
TRANSFER - OUT REPORT: 
 
Verbal report given to Sam Machuca (name) on Cesar Real.  being transferred to (unit) for routine progression of care Report consisted of patients Situation, Background, Assessment and  
Recommendations(SBAR). Information from the following report(s) SBAR, ED Summary, Intake/Output, MAR and Recent Results was reviewed with the receiving nurse. Lines:  
Peripheral IV 06/24/19 Left Hand (Active) Site Assessment Clean, dry, & intact 6/24/2019  1:16 AM  
Phlebitis Assessment 0 6/24/2019  1:16 AM  
Infiltration Assessment 0 6/24/2019  1:16 AM  
Dressing Status Clean, dry, & intact 6/24/2019  1:16 AM  
   
Peripheral IV 06/24/19 Left Antecubital (Active) Site Assessment Clean, dry, & intact 6/24/2019  1:16 AM  
Phlebitis Assessment 0 6/24/2019  1:16 AM  
Infiltration Assessment 0 6/24/2019  1:16 AM  
Dressing Status Clean, dry, & intact 6/24/2019  1:16 AM  
  
 
Opportunity for questions and clarification was provided. Patient transported with: 
Transport

## 2019-06-24 NOTE — PROGRESS NOTES
Pharmacist Note - Vancomycin Dosing    Consult provided for this 37 y.o. male for indication of R arm cellulitis  -failed outpatient doxycycline. Antibiotic regimen(s): Vanc + Unasyn    Recent Labs     19   WBC 5.7 9.4   CREA 0.93 1.10   BUN 9 16     Height: 168 cm  Weight: 98 kg  Est CrCl: >100 ml/min  Temp (24hrs), Av.2 °F (36.8 °C), Min:98.2 °F (36.8 °C), Max:98.2 °F (36.8 °C)    Goal trough = 10 - 15 mcg/mL    Therapy will be initiated with a loading dose of 2000 mg IV x 1 to be followed by a maintenance dose of 1500 mg IV every 12 hours. Pharmacy to follow patient daily and order levels / make dose adjustments as appropriate.

## 2019-06-24 NOTE — ED TRIAGE NOTES
Ambulatory from home with C/O pain, redness and swelling R arm. First noticed discomfort on Thurs. And was seen here Fri. And placed on antibiotic. Tonight will be the last dose of a 3 day RX and the pain, swelling and redness has increased.

## 2019-06-24 NOTE — H&P
[facility]  HISTORY AND PHYSICAL    Name:  Estephania Guzman  MR#:  [id number]  :  1975  ACCOUNT #:  [de-identified]  ADMIT DATE:  2019      PRIMARY CARE PHYSICIAN:  Nicanor Soria NP    SOURCE OF INFORMATION:  Patient. CHIEF COMPLAINT:  Right arm redness and pain. HISTORY OF PRESENT ILLNESS:  This is a 57-year-old man with a past medical history significant for type 2 diabetes, dyslipidemia, hypertension, was in his usual state of health until a few days ago when the patient developed redness and pain involving the right arm. There was no injury or trauma to the right arm. The pain is constant, sharp pain. No known aggravating or relieving factors. The redness is progressive and getting worse. The patient came to the emergency room initially at the onset of his symptoms. The patient was evaluated and was prescribed doxycycline and discharged home. Despite taking the antibiotics, the patient's symptoms did not improve. Because of that, he came back to the emergency room. The patient described the pain in the right arm as 9/10 in severity. The patient also stated that he has fever, rigors and chills at home. The patient arrived at the emergency room, CT scan of the right upper extremity was obtained. This did not show any abscess formation. The patient was started on antibiotics and was referred to the hospitalist service for evaluation for admission. No record of prior admission to this hospital.    PAST MEDICAL HISTORY:  Type 2 diabetes, dyslipidemia, hypertension. ALLERGIES:  NO KNOWN DRUG ALLERGIES. MEDICATIONS:  Doxycycline 100 mg twice daily, Ativan 0.5 mg every 8 hours as needed for anxiety, Glucophage 500 mg twice daily, Naprosyn 500 mg twice daily, Zofran 4 mg every 8 hours as needed for nausea, Percocet 5/325 one tablet every 4 hours as needed for pain. FAMILY HISTORY:  This was reviewed.   His mother had cancer, the type of cancer is not known and diabetes. His father has hypertension. PAST SURGICAL HISTORY:  This is significant for cholecystectomy. SOCIAL HISTORY:  The patient denies alcohol abuse, but the patient uses alternative form of tobacco product. REVIEW OF SYSTEMS:  HEAD, EYES, EARS, NOSE AND THROAT:  This is positive for headache, no blurring of vision, no photophobia. RESPIRATORY SYSTEM:  No cough, no shortness of breath, no hemoptysis. CARDIOVASCULAR SYSTEM:  No chest pain, no orthopnea, no palpitations. GASTROINTESTINAL SYSTEM:  No nausea or vomiting. No diarrhea. No constipation. GENITOURINARY SYSTEM:  No dysuria, no urgency and no frequency. All other systems are reviewed and they are negative. PHYSICAL EXAMINATION:  GENERAL APPEARANCE:  The patient appeared ill, in moderate distress. VITAL SIGNS:  On arrival at the emergency room temperature 98.2, pulse 100, respiratory rate 16, blood pressure 156/78, oxygen saturation 99% on room air. HEENT:  Head:  Normocephalic, atraumatic. Eyes:  Normal eye movements. No redness, no drainage, no discharge. Ears:  Normal external ears with no evidence of drainage. Nose:  No deformity and no drainage. Mouth and Throat:  No visible oral lesion. NECK:  Neck is supple. No JVD, no thyromegaly. CHEST:  Clear breath sounds. No wheezing, no crackles. HEART:  Normal S1 and S2, regular. No clinically appreciable murmur. ABDOMEN:  Soft, nontender, normal bowel sounds. CNS:  Alert, oriented x3. No gross focal neurological deficit. EXTREMITIES:  No edema. Pulses 2+ bilaterally. MUSCULOSKELETAL SYSTEM:  No evidence of joint deformity and swelling. SKIN:  Redness and tenderness of the right arm noted and present on admission. PSYCHIATRY:  Normal mood and affect. LYMPHATIC SYSTEM:  No cervical lymphadenopathy. DIAGNOSTIC DATA:  CT scan of the right upper extremity, no acute fracture or dislocation is identified.     LABORATORY DATA:  Hematology:  WBC 5.7, hemoglobin 14.3, hematocrit 41.0, platelets 092. Chemistry:  Sodium 138, potassium 4.1, chloride 103, CO2 28, glucose 228, BUN 9, creatinine 0.93, calcium 9.5. ASSESSMENT:  1. Cellulitis right upper extremity. 2.  Type 2 diabetes with hyperglycemia. 3.  Dyslipidemia. 4.  Hypertension. 5.  Tobacco abuse. PLAN:  1. Cellulitis right upper extremity. We will admit the patient for further evaluation and treatment. We will start the patient on vancomycin and Unasyn. We will obtain an ultrasound of the right upper extremity to evaluate the patient for DVT. We will monitor the patient's clinical response to treatment. We will check ESR and C-reactive protein level. We will check urine drug screen. 2.  Type 2 diabetes. The patient will be placed on sliding scale with insulin coverage. We will hold oral agents till the time of discharge from the hospital.  3.  Dyslipidemia. We will resume preadmission medication. We will check lipid profile. 4.  Hypertension. We will start the patient on lisinopril. Monitor the patient's blood pressure closely. 5.  Tobacco abuse. The patient advised to quit tobacco product. We will place the patient on Nicoderm patch. OTHER ISSUES:  Code status: The patient is a full code. We will place the patient on heparin for DVT prophylaxis. FUNCTIONAL STATUS PRIOR TO ADMISSION:  The patient is ambulatory without assistant or device.       Zaki Sabillon MD      [id number]  D:  06/24/2019 6:27  T:  06/24/2019 6:31  JOB #:  [id number]  CC:  Lauren Barajas NP

## 2019-06-24 NOTE — PROGRESS NOTES
Hospitalist Progress Note  Luis Alberto Ibarra MD  Answering service: [phone] from in house phone        Date of Service:  2019  NAME:  Vijay Hernandez. :  1975  MRN:  [id number]      Admission Summary:   36 y/o PMH of DM,HLD,h/o Lyme disease p/w right arm redness,progressively worsening   Recently seen in ED ,dx rt arm cellulitis,d/jose on doxy,last dose ,returns to ER    with worsening swelling & redness a/wpain,last night with subjective fever,sweating,HA    Interval history / Subjective:    Rt arm swelling /redness,no fever chills ,HA better. BG/BP  better control,pt reports has not been taking meds/ran out of insurance    no fever,wbc 5.2  CRP 0.97,tox screen +benzo /opiates otherwise neg ,  doppler neg for DVT  Assessment & Plan:     Cellulitis Rt Arm  -failed po abx, completed doxycycline   -CT with cellulitis post. To proximal ulna,No abscess or OM  -IV abx, Vanc, uansyn  -pain management  -monitor blood cx,to de escalate abx  -elevation,pain mangement     DM2 with hyperglycemia  -hgb 7.7  -uncontrolled/non compliance  -SSI ,aacuu checks  -diabetic teaching/education   -metformin on dicharge    Hypertriglyceridemia  -  -has h/o pancreatitis  -start statin    HTN  -controlled   -s/p started on lisinopril    Tobacco abuse  -nicotine patch  -already counseled for cessation          Code status: full  DVT prophylaxis:SCD,heparin    Care Plan discussed with: Patient/Family and Nurse  Disposition: Home w/Family and TBD     Hospital Problems  Date Reviewed: 2019          Codes Class Noted POA    * (Principal) Cellulitis ICD-10-CM: L03.90  ICD-9-CM: 682.9  2019 Yes                Review of Systems:   A comprehensive review of systems was negative except for that written in the HPI. Vital Signs:    Last 24hrs VS reviewed since prior progress note.  Most recent are:  Visit Vitals  /76 (BP 1 Location: Left arm, BP Patient Position: Sitting)   Pulse 82   Temp 97.2 °F (36.2 °C)   Resp 18   Ht 5' 6\" (1.676 m)   Wt 98 kg (216 lb 0.8 oz)   SpO2 95%   BMI 34.87 kg/m²       No intake or output data in the 24 hours ending 06/24/19 0754     Physical Examination:             Constitutional:  No acute distress, cooperative, pleasant    ENT:  Oral mucous moist, oropharynx benign. Neck supple,    Resp:  CTA bilaterally. No wheezing/rhonchi/rales. No accessory muscle use   CV:  Regular rhythm, normal rate, no murmurs, gallops, rubs    GI:  Soft, non distended, non tender. normoactive bowel sounds, no hepatosplenomegaly     Musculoskeletal:  RUExt pain on flexion,pulse 2+,NV intact    Neurologic:  Moves all extremities. AAOx3, CN II-XII reviewed     Psych:  Good insight, Not anxious nor agitated. SKIN: Rt Fore arm erythema +warm,TTP,swelling, + Tattoos b/l UEXts        Data Review:    Review and/or order of clinical lab test  Review and/or order of tests in the radiology section of CPT  Review and/or order of tests in the medicine section of CPT      Labs:     Recent Labs     06/24/19 0445 06/23/19 2124   WBC 5.2 5.7   HGB 12.7 14.3   HCT 36.4* 41.0    231     Recent Labs     06/24/19 0445 06/23/19 2124 06/21/19 2025    138 136   K 3.8 4.1 3.5    103 104   CO2 24 28 23   BUN 9 9 16   CREA 0.75 0.93 1.10   * 228* 230*   CA 8.5 9.5 9.4   MG 1.8  --   --    PHOS 3.7  --   --      Recent Labs     06/21/19 2025   SGOT 33   ALT 44   AP 68   TBILI 1.4*   TP 8.7*   ALB 4.5   GLOB 4.2*     No results for input(s): INR, PTP, APTT in the last 72 hours. No lab exists for component: INREXT   No results for input(s): FE, TIBC, PSAT, FERR in the last 72 hours. No results found for: FOL, RBCF   No results for input(s): PH, PCO2, PO2 in the last 72 hours.   Recent Labs     06/21/19 2025        Lab Results   Component Value Date/Time    Cholesterol, total 123 06/24/2019 04:45 AM HDL Cholesterol 31 06/24/2019 04:45 AM    LDL, calculated 31.2 06/24/2019 04:45 AM    Triglyceride 304 (H) 06/24/2019 04:45 AM    CHOL/HDL Ratio 4.0 06/24/2019 04:45 AM     Lab Results   Component Value Date/Time    Glucose (POC) 214 (H) 06/24/2019 06:08 AM    Glucose (POC) 140 (H) 02/29/2016 01:52 AM    Glucose (POC) 338 (H) 02/28/2016 10:04 PM    Glucose  09/23/2014 10:45 AM     Lab Results   Component Value Date/Time    Color YELLOW 02/28/2016 10:40 PM    Appearance CLEAR 02/28/2016 10:40 PM    Specific gravity >1.030 (H) 02/28/2016 10:40 PM    pH (UA) 5.5 02/28/2016 10:40 PM    Protein NEGATIVE  02/28/2016 10:40 PM    Glucose >1,000 (A) 02/28/2016 10:40 PM    Ketone NEGATIVE  02/28/2016 10:40 PM    Bilirubin NEGATIVE  02/28/2016 10:40 PM    Urobilinogen 0.2 02/28/2016 10:40 PM    Nitrites NEGATIVE  02/28/2016 10:40 PM    Leukocyte Esterase NEGATIVE  02/28/2016 10:40 PM         Medications Reviewed:     Current Facility-Administered Medications   Medication Dose Route Frequency    LORazepam (ATIVAN) tablet 1 mg  1 mg Oral Q8H PRN    lisinopril (PRINIVIL, ZESTRIL) tablet 10 mg  10 mg Oral DAILY    sodium chloride (NS) flush 5-40 mL  5-40 mL IntraVENous Q8H    sodium chloride (NS) flush 5-40 mL  5-40 mL IntraVENous PRN    ondansetron (ZOFRAN) injection 4 mg  4 mg IntraVENous Q4H PRN    bisacodyl (DULCOLAX) tablet 5 mg  5 mg Oral DAILY PRN    nicotine (NICODERM CQ) 21 mg/24 hr patch 1 Patch  1 Patch TransDERmal Q24H    heparin (porcine) injection 5,000 Units  5,000 Units SubCUTAneous Q8H    lactobac ac& pc-s.therm-b.anim (FERNIE Q/RISAQUAD)  1 Cap Oral DAILY    insulin lispro (HUMALOG) injection   SubCUTAneous AC&HS    glucose chewable tablet 16 g  4 Tab Oral PRN    dextrose (D50W) injection syrg 12.5-25 g  12.5-25 g IntraVENous PRN    glucagon (GLUCAGEN) injection 1 mg  1 mg IntraMUSCular PRN    naloxone (NARCAN) injection 0.4 mg  0.4 mg IntraVENous PRN    iopamidol (ISOVUE 300) 61 % contrast injection 100 mL  100 mL IntraVENous RAD ONCE    sodium chloride (NS) flush 10 mL  10 mL IntraVENous RAD ONCE    acetaminophen (TYLENOL) tablet 650 mg  650 mg Oral Q4H PRN    ketorolac (TORADOL) injection 15 mg  15 mg IntraVENous Q6H PRN    HYDROmorphone (PF) (DILAUDID) injection 1 mg  1 mg IntraVENous Q4H PRN    lactated Ringers infusion  100 mL/hr IntraVENous CONTINUOUS    ampicillin-sulbactam 3 g in 0.9% sodium chloride (MBP/ADV) 100 mL ADV  3 g IntraVENous Q6H    Vancomycin- pharmacy to dose   Other Rx Dosing/Monitoring    vancomycin (VANCOCIN) 1500 mg in  ml infusion  1,500 mg IntraVENous Q12H     ______________________________________________________________________  EXPECTED LENGTH OF STAY: - - -  ACTUAL LENGTH OF STAY:          1                 Sherwin Karimi MD

## 2019-06-25 VITALS
BODY MASS INDEX: 34.72 KG/M2 | TEMPERATURE: 97.5 F | HEART RATE: 76 BPM | HEIGHT: 66 IN | RESPIRATION RATE: 18 BRPM | WEIGHT: 216.05 LBS | OXYGEN SATURATION: 98 % | DIASTOLIC BLOOD PRESSURE: 75 MMHG | SYSTOLIC BLOOD PRESSURE: 127 MMHG

## 2019-06-25 PROBLEM — L03.90 CELLULITIS: Status: RESOLVED | Noted: 2019-06-23 | Resolved: 2019-06-25

## 2019-06-25 LAB
ANION GAP SERPL CALC-SCNC: 4 MMOL/L (ref 5–15)
BUN SERPL-MCNC: 7 MG/DL (ref 6–20)
BUN/CREAT SERPL: 9 (ref 12–20)
CALCIUM SERPL-MCNC: 8.5 MG/DL (ref 8.5–10.1)
CHLORIDE SERPL-SCNC: 107 MMOL/L (ref 97–108)
CO2 SERPL-SCNC: 28 MMOL/L (ref 21–32)
CREAT SERPL-MCNC: 0.75 MG/DL (ref 0.7–1.3)
GLUCOSE BLD STRIP.AUTO-MCNC: 183 MG/DL (ref 65–100)
GLUCOSE SERPL-MCNC: 171 MG/DL (ref 65–100)
POTASSIUM SERPL-SCNC: 3.9 MMOL/L (ref 3.5–5.1)
SERVICE CMNT-IMP: ABNORMAL
SODIUM SERPL-SCNC: 139 MMOL/L (ref 136–145)

## 2019-06-25 PROCEDURE — 74011250636 HC RX REV CODE- 250/636: Performed by: INTERNAL MEDICINE

## 2019-06-25 PROCEDURE — 74011636637 HC RX REV CODE- 636/637: Performed by: INTERNAL MEDICINE

## 2019-06-25 PROCEDURE — 82962 GLUCOSE BLOOD TEST: CPT

## 2019-06-25 PROCEDURE — 74011250636 HC RX REV CODE- 250/636: Performed by: FAMILY MEDICINE

## 2019-06-25 PROCEDURE — 36415 COLL VENOUS BLD VENIPUNCTURE: CPT

## 2019-06-25 PROCEDURE — 80048 BASIC METABOLIC PNL TOTAL CA: CPT

## 2019-06-25 PROCEDURE — 74011000258 HC RX REV CODE- 258: Performed by: INTERNAL MEDICINE

## 2019-06-25 PROCEDURE — 74011250637 HC RX REV CODE- 250/637: Performed by: INTERNAL MEDICINE

## 2019-06-25 RX ORDER — AMOXICILLIN AND CLAVULANATE POTASSIUM 875; 125 MG/1; MG/1
1 TABLET, FILM COATED ORAL 2 TIMES DAILY
Qty: 14 TAB | Refills: 0 | Status: SHIPPED | OUTPATIENT
Start: 2019-06-25 | End: 2020-02-11

## 2019-06-25 RX ORDER — SULFAMETHOXAZOLE AND TRIMETHOPRIM 800; 160 MG/1; MG/1
1 TABLET ORAL 2 TIMES DAILY
Qty: 12 TAB | Refills: 0 | Status: SHIPPED | OUTPATIENT
Start: 2019-06-25 | End: 2020-02-11

## 2019-06-25 RX ADMIN — HEPARIN SODIUM 5000 UNITS: 5000 INJECTION INTRAVENOUS; SUBCUTANEOUS at 00:56

## 2019-06-25 RX ADMIN — ONDANSETRON 4 MG: 2 INJECTION INTRAMUSCULAR; INTRAVENOUS at 06:10

## 2019-06-25 RX ADMIN — VANCOMYCIN HYDROCHLORIDE 1500 MG: 10 INJECTION, POWDER, LYOPHILIZED, FOR SOLUTION INTRAVENOUS at 09:44

## 2019-06-25 RX ADMIN — LISINOPRIL 10 MG: 10 TABLET ORAL at 09:43

## 2019-06-25 RX ADMIN — SODIUM CHLORIDE 3 G: 900 INJECTION, SOLUTION INTRAVENOUS at 05:25

## 2019-06-25 RX ADMIN — INSULIN LISPRO 2 UNITS: 100 INJECTION, SOLUTION INTRAVENOUS; SUBCUTANEOUS at 07:43

## 2019-06-25 RX ADMIN — HYDROMORPHONE HYDROCHLORIDE 1 MG: 1 INJECTION, SOLUTION INTRAMUSCULAR; INTRAVENOUS; SUBCUTANEOUS at 09:48

## 2019-06-25 RX ADMIN — Medication 1 CAPSULE: at 09:43

## 2019-06-25 RX ADMIN — HEPARIN SODIUM 5000 UNITS: 5000 INJECTION INTRAVENOUS; SUBCUTANEOUS at 09:43

## 2019-06-25 RX ADMIN — HYDROMORPHONE HYDROCHLORIDE 1 MG: 1 INJECTION, SOLUTION INTRAMUSCULAR; INTRAVENOUS; SUBCUTANEOUS at 05:30

## 2019-06-25 RX ADMIN — HYDROMORPHONE HYDROCHLORIDE 1 MG: 1 INJECTION, SOLUTION INTRAMUSCULAR; INTRAVENOUS; SUBCUTANEOUS at 00:56

## 2019-06-25 RX ADMIN — Medication 10 ML: at 05:25

## 2019-06-25 NOTE — DISCHARGE INSTRUCTIONS
Discharge Instructions       PATIENT ID: Gerber Dickey. MRN: [id number]   YOB: 1975    DATE OF ADMISSION: 6/23/2019  9:15 PM    DATE OF DISCHARGE: 6/25/2019    PRIMARY CARE PROVIDER: Antonia Martin NP     ATTENDING PHYSICIAN: Lora Simon MD  DISCHARGING PROVIDER: Donavan Spangler MD    To contact this individual call [phone] and ask the  to page. If unavailable ask to be transferred the Adult Hospitalist Department. DISCHARGE DIAGNOSES cellulitis rt forearm     CONSULTATIONS: None    PROCEDURES/SURGERIES: * No surgery found *    PENDING TEST RESULTS:   At the time of discharge the following test results are still pending: na    FOLLOW UP APPOINTMENTS:   Follow-up Information     Follow up With Specialties Details Why Contact Info    Antonia Martni NP Nurse Practitioner In 3 days  [address] [address] [phone]             ADDITIONAL CARE RECOMMENDATIONS: elevate rt arm til improved/resolved     DIET: Resume previous diet     ACTIVITY: Activity as tolerated, note off below til next Monday     WOUND CARE: na    EQUIPMENT needed: na      DISCHARGE MEDICATIONS:   See Medication Reconciliation Form    · It is important that you take the medication exactly as they are prescribed. · Keep your medication in the bottles provided by the pharmacist and keep a list of the medication names, dosages, and times to be taken in your wallet. · Do not take other medications without consulting your doctor. NOTIFY YOUR PHYSICIAN FOR ANY OF THE FOLLOWING:   Fever over 101 degrees for 24 hours. Chest pain, shortness of breath, fever, chills, nausea, vomiting, diarrhea, change in mentation, falling, weakness, bleeding. Severe pain or pain not relieved by medications. Or, any other signs or symptoms that you may have questions about.       DISPOSITION:    Home With:   OT  PT  HH  RN       SNF/Inpatient Rehab/LTAC   x Independent/assisted living Hospice    Other:            Signed:   Deric Alfaro MD  6/25/2019  10:29 AM        [address] June 25, 2019       RE: 90Alexandro Bardales To Whom It May Concern,    This is to certify that Matty Bardales. may may return to work on 7/1/2019 . Please feel free to contact my office if you have any questions or concerns. Thank you for your assistance in this matter.       Sincerely,  Deric Alfaro MD

## 2019-06-25 NOTE — PROGRESS NOTES
I went over the patient's discharge instructions and he understood them. I provided the patient with his written prescriptions and copy of his discharge instructions. I provided opportunity for questions.

## 2019-06-25 NOTE — DISCHARGE SUMMARY
Discharge Summary       PATIENT ID: Alejandro Cuenca MRN: [id number]   YOB: 1975    DATE OF ADMISSION: 6/23/2019  9:15 PM    DATE OF DISCHARGE: 6/25/2019    PRIMARY CARE PROVIDER: Gigi Blunt NP     ATTENDING PHYSICIAN: Gris Moore MD   DISCHARGING PROVIDER: Gris Moore MD    To contact this individual call [phone] and ask the  to page. If unavailable ask to be transferred the Adult Hospitalist Department. CONSULTATIONS: None    PROCEDURES/SURGERIES: * No surgery found *    ADMITTING DIAGNOSES & HOSPITAL COURSE:       Dirty water stick to arm, neg xray for foreign object, improvement on iv abx ; No abscess by imaging and exam, injury site volar aspect rt forearm. Pt's cellulitic changes resolves slowly to residual phlegmon 3 cm across. Pt w/o fever, resolving cellulitic redness and no shift, nl wbc counts. Pt recommended to elevate rt arm til well healed, return to work in 6 days. See PCP in 3 days. DISCHARGE DIAGNOSES / PLAN:      1.  cellulitis rt shawna      ADDITIONAL CARE RECOMMENDATIONS: as above     PENDING TEST RESULTS:   At the time of discharge the following test results are still pending: na    FOLLOW UP APPOINTMENTS:    Follow-up Information     Follow up With Specialties Details Why Contact Info    Gigi Blunt NP Nurse Practitioner In 3 days  [address] [facility] [address] [phone]               DIET: Resume previous diet       ACTIVITY: Activity as tolerated    WOUND CARE: na    EQUIPMENT needed: na      DISCHARGE MEDICATIONS:  Current Discharge Medication List      START taking these medications    Details   amoxicillin-clavulanate (AUGMENTIN) 875-125 mg per tablet Take 1 Tab by mouth two (2) times a day. Qty: 14 Tab, Refills: 0      trimethoprim-sulfamethoxazole (BACTRIM DS) 160-800 mg per tablet Take 1 Tab by mouth two (2) times a day.   Qty: 12 Tab, Refills: 0         CONTINUE these medications which have NOT CHANGED    Details   metFORMIN (GLUCOPHAGE) 500 mg tablet Take 1 Tab by mouth two (2) times daily (with meals). Qty: 60 Tab, Refills: 2      naproxen (NAPROSYN) 500 mg tablet Take 1 Tab by mouth two (2) times daily (with meals) for 10 days. Qty: 20 Tab, Refills: 0      LORazepam (ATIVAN) 0.5 mg tablet Take 2 Tabs by mouth every eight (8) hours as needed for Anxiety. Max Daily Amount: 3 mg. Indications: muscle spasm  Qty: 20 Tab, Refills: 0    Associated Diagnoses: Lumbar strain, initial encounter      ondansetron hcl (ZOFRAN, AS HYDROCHLORIDE,) 4 mg tablet Take 1 Tab by mouth every eight (8) hours as needed for Nausea. Qty: 14 Tab, Refills: 0         STOP taking these medications       doxycycline (VIBRA-TABS) 100 mg tablet Comments:   Reason for Stopping:         oxyCODONE-acetaminophen (PERCOCET) 5-325 mg per tablet Comments:   Reason for Stopping:                 NOTIFY YOUR PHYSICIAN FOR ANY OF THE FOLLOWING:   Fever over 101 degrees for 24 hours. Chest pain, shortness of breath, fever, chills, nausea, vomiting, diarrhea, change in mentation, falling, weakness, bleeding. Severe pain or pain not relieved by medications. Or, any other signs or symptoms that you may have questions about.     DISPOSITION:    Home With:   OT  PT  HH  RN       Long term SNF/Inpatient Rehab   x Independent/assisted living    Hospice    Other:       PATIENT CONDITION AT DISCHARGE:     Functional status    Poor     Deconditioned    x Independent      Cognition    x Lucid     Forgetful     Dementia      Catheters/lines (plus indication)    Davis     PICC     PEG    x None      Code status    x Full code     DNR      PHYSICAL EXAMINATION AT DISCHARGE:   Refer to Progress Note  See above       CHRONIC MEDICAL DIAGNOSES:  Problem List as of 6/25/2019 Date Reviewed: 6/25/2019          Codes Class Noted - Resolved    Di[facility] ([facility] ICD-10-CM: E11.9  ICD-9-CM: 250.00  9/23/2014 - Present        Pancreatitis ICD-10-CM: K85.90  ICD-9-CM: 577.0  9/23/2014 - Present        * (Principal) RESOLVED: Cellulitis ICD-10-CM: L03.90  ICD-9-CM: 682.9  6/23/2019 - 6/25/2019              Greater than 20 minutes were spent with the patient on counseling and coordination of care    Signed:   Selena Zavala MD  6/25/2019  10:30 AM

## 2019-06-25 NOTE — PROGRESS NOTES
Bedside shift change report given to Frank Barros (oncoming nurse) by Tobin lomeli RN (offgoing nurse). Report included the following information SBAR.

## 2019-07-11 ENCOUNTER — DOCUMENTATION ONLY (OUTPATIENT)
Dept: SURGERY | Age: 44
End: 2019-07-11

## 2019-07-11 NOTE — PROGRESS NOTES
Patient was scheduled to see sariah on 7/9 but did not show for appointment. I called him in regards his no show and he stated that he is fine and does not need a follow up.

## 2020-02-13 ENCOUNTER — HOSPITAL ENCOUNTER (INPATIENT)
Age: 45
LOS: 3 days | Discharge: HOME | DRG: 432 | End: 2020-02-16
Attending: INTERNAL MEDICINE | Admitting: INTERNAL MEDICINE
Payer: SELF-PAY

## 2020-02-13 DIAGNOSIS — K70.11 ALCOHOLIC HEPATITIS WITH ASCITES: Primary | ICD-10-CM

## 2020-02-13 PROBLEM — K75.81 NASH (NONALCOHOLIC STEATOHEPATITIS): Status: ACTIVE | Noted: 2020-02-13

## 2020-02-13 LAB
ALBUMIN SERPL-MCNC: 2.4 G/DL (ref 3.5–5)
ALBUMIN/GLOB SERPL: 0.6 {RATIO} (ref 1.1–2.2)
ALP SERPL-CCNC: 147 U/L (ref 45–117)
ALT SERPL-CCNC: 102 U/L (ref 12–78)
AMMONIA PLAS-SCNC: 57 UMOL/L
ANION GAP SERPL CALC-SCNC: 10 MMOL/L (ref 5–15)
AST SERPL-CCNC: 192 U/L (ref 15–37)
BASOPHILS # BLD: 0 K/UL (ref 0–0.1)
BASOPHILS NFR BLD: 1 % (ref 0–1)
BILIRUB SERPL-MCNC: 1.4 MG/DL (ref 0.2–1)
BUN SERPL-MCNC: 5 MG/DL (ref 6–20)
BUN/CREAT SERPL: 4 (ref 12–20)
CALCIUM SERPL-MCNC: 7.1 MG/DL (ref 8.5–10.1)
CHLORIDE SERPL-SCNC: 95 MMOL/L (ref 97–108)
CO2 SERPL-SCNC: 29 MMOL/L (ref 21–32)
CREAT SERPL-MCNC: 1.27 MG/DL (ref 0.7–1.3)
DIFFERENTIAL METHOD BLD: ABNORMAL
EOSINOPHIL # BLD: 0 K/UL (ref 0–0.4)
EOSINOPHIL NFR BLD: 0 % (ref 0–7)
ERYTHROCYTE [DISTWIDTH] IN BLOOD BY AUTOMATED COUNT: 15.2 % (ref 11.5–14.5)
GLOBULIN SER CALC-MCNC: 4 G/DL (ref 2–4)
GLUCOSE SERPL-MCNC: 203 MG/DL (ref 65–100)
HAV IGM SER QL: NONREACTIVE
HBV CORE IGM SER QL: NONREACTIVE
HBV SURFACE AG SER QL: <0.1 INDEX
HBV SURFACE AG SER QL: NEGATIVE
HCT VFR BLD AUTO: 30.7 % (ref 36.6–50.3)
HCV AB SERPL QL IA: NONREACTIVE
HCV COMMENT,HCGAC: NORMAL
HGB BLD-MCNC: 10.4 G/DL (ref 12.1–17)
IMM GRANULOCYTES # BLD AUTO: 0 K/UL (ref 0–0.04)
IMM GRANULOCYTES NFR BLD AUTO: 0 % (ref 0–0.5)
INR PPP: 1.4 (ref 0.9–1.1)
LACTATE SERPL-SCNC: 1.3 MMOL/L (ref 0.4–2)
LIPASE SERPL-CCNC: 115 U/L (ref 73–393)
LYMPHOCYTES # BLD: 2 K/UL (ref 0.8–3.5)
LYMPHOCYTES NFR BLD: 45 % (ref 12–49)
MCH RBC QN AUTO: 33.3 PG (ref 26–34)
MCHC RBC AUTO-ENTMCNC: 33.9 G/DL (ref 30–36.5)
MCV RBC AUTO: 98.4 FL (ref 80–99)
MONOCYTES # BLD: 0.3 K/UL (ref 0–1)
MONOCYTES NFR BLD: 8 % (ref 5–13)
NEUTS SEG # BLD: 2.1 K/UL (ref 1.8–8)
NEUTS SEG NFR BLD: 47 % (ref 32–75)
NRBC # BLD: 0 K/UL (ref 0–0.01)
NRBC BLD-RTO: 0 PER 100 WBC
PLATELET # BLD AUTO: 74 K/UL (ref 150–400)
PMV BLD AUTO: 11.5 FL (ref 8.9–12.9)
POTASSIUM SERPL-SCNC: 3.3 MMOL/L (ref 3.5–5.1)
PROT SERPL-MCNC: 6.4 G/DL (ref 6.4–8.2)
PROTHROMBIN TIME: 13.8 SEC (ref 9–11.1)
RBC # BLD AUTO: 3.12 M/UL (ref 4.1–5.7)
SODIUM SERPL-SCNC: 134 MMOL/L (ref 136–145)
SP1: NORMAL
SP2: NORMAL
SP3: NORMAL
WBC # BLD AUTO: 4.4 K/UL (ref 4.1–11.1)

## 2020-02-13 PROCEDURE — 80074 ACUTE HEPATITIS PANEL: CPT

## 2020-02-13 PROCEDURE — 83690 ASSAY OF LIPASE: CPT

## 2020-02-13 PROCEDURE — 82140 ASSAY OF AMMONIA: CPT

## 2020-02-13 PROCEDURE — 85610 PROTHROMBIN TIME: CPT

## 2020-02-13 PROCEDURE — 36415 COLL VENOUS BLD VENIPUNCTURE: CPT

## 2020-02-13 PROCEDURE — 80053 COMPREHEN METABOLIC PANEL: CPT

## 2020-02-13 PROCEDURE — 83605 ASSAY OF LACTIC ACID: CPT

## 2020-02-13 PROCEDURE — 65270000029 HC RM PRIVATE

## 2020-02-13 PROCEDURE — 74011250636 HC RX REV CODE- 250/636: Performed by: INTERNAL MEDICINE

## 2020-02-13 PROCEDURE — 85025 COMPLETE CBC W/AUTO DIFF WBC: CPT

## 2020-02-13 PROCEDURE — 94760 N-INVAS EAR/PLS OXIMETRY 1: CPT

## 2020-02-13 PROCEDURE — 74011250636 HC RX REV CODE- 250/636: Performed by: FAMILY MEDICINE

## 2020-02-13 PROCEDURE — 74011250637 HC RX REV CODE- 250/637: Performed by: INTERNAL MEDICINE

## 2020-02-13 RX ORDER — MORPHINE SULFATE 2 MG/ML
2 INJECTION, SOLUTION INTRAMUSCULAR; INTRAVENOUS
Status: DISCONTINUED | OUTPATIENT
Start: 2020-02-13 | End: 2020-02-13

## 2020-02-13 RX ORDER — SODIUM CHLORIDE 0.9 % (FLUSH) 0.9 %
5-40 SYRINGE (ML) INJECTION AS NEEDED
Status: DISCONTINUED | OUTPATIENT
Start: 2020-02-13 | End: 2020-02-16 | Stop reason: HOSPADM

## 2020-02-13 RX ORDER — BISACODYL 5 MG
5 TABLET, DELAYED RELEASE (ENTERIC COATED) ORAL DAILY PRN
Status: DISCONTINUED | OUTPATIENT
Start: 2020-02-13 | End: 2020-02-16 | Stop reason: HOSPADM

## 2020-02-13 RX ORDER — FOLIC ACID 1 MG/1
1 TABLET ORAL DAILY
Status: DISCONTINUED | OUTPATIENT
Start: 2020-02-13 | End: 2020-02-16 | Stop reason: HOSPADM

## 2020-02-13 RX ORDER — MORPHINE SULFATE 2 MG/ML
4 INJECTION, SOLUTION INTRAMUSCULAR; INTRAVENOUS
Status: DISCONTINUED | OUTPATIENT
Start: 2020-02-13 | End: 2020-02-13

## 2020-02-13 RX ORDER — ONDANSETRON 2 MG/ML
4 INJECTION INTRAMUSCULAR; INTRAVENOUS
Status: DISCONTINUED | OUTPATIENT
Start: 2020-02-13 | End: 2020-02-16 | Stop reason: HOSPADM

## 2020-02-13 RX ORDER — LORAZEPAM 2 MG/ML
1 INJECTION INTRAMUSCULAR
Status: DISCONTINUED | OUTPATIENT
Start: 2020-02-13 | End: 2020-02-16 | Stop reason: HOSPADM

## 2020-02-13 RX ORDER — SODIUM CHLORIDE 0.9 % (FLUSH) 0.9 %
5-40 SYRINGE (ML) INJECTION EVERY 8 HOURS
Status: DISCONTINUED | OUTPATIENT
Start: 2020-02-13 | End: 2020-02-16 | Stop reason: HOSPADM

## 2020-02-13 RX ORDER — HYDROMORPHONE HYDROCHLORIDE 1 MG/ML
0.5 INJECTION, SOLUTION INTRAMUSCULAR; INTRAVENOUS; SUBCUTANEOUS
Status: DISCONTINUED | OUTPATIENT
Start: 2020-02-13 | End: 2020-02-14

## 2020-02-13 RX ORDER — ASPIRIN 325 MG/1
100 TABLET, FILM COATED ORAL DAILY
Status: DISCONTINUED | OUTPATIENT
Start: 2020-02-13 | End: 2020-02-16 | Stop reason: HOSPADM

## 2020-02-13 RX ADMIN — HYDROMORPHONE HYDROCHLORIDE 0.5 MG: 1 INJECTION, SOLUTION INTRAMUSCULAR; INTRAVENOUS; SUBCUTANEOUS at 17:18

## 2020-02-13 RX ADMIN — Medication 10 ML: at 05:31

## 2020-02-13 RX ADMIN — Medication 10 ML: at 06:08

## 2020-02-13 RX ADMIN — Medication 10 ML: at 04:03

## 2020-02-13 RX ADMIN — Medication 10 ML: at 22:00

## 2020-02-13 RX ADMIN — FOLIC ACID 1 MG: 1 TABLET ORAL at 09:52

## 2020-02-13 RX ADMIN — Medication 10 ML: at 14:00

## 2020-02-13 RX ADMIN — Medication 100 MG: at 09:52

## 2020-02-13 RX ADMIN — LORAZEPAM 1 MG: 2 INJECTION INTRAMUSCULAR; INTRAVENOUS at 04:03

## 2020-02-13 RX ADMIN — LORAZEPAM 1 MG: 2 INJECTION INTRAMUSCULAR; INTRAVENOUS at 09:52

## 2020-02-13 RX ADMIN — LORAZEPAM 1 MG: 2 INJECTION INTRAMUSCULAR; INTRAVENOUS at 06:07

## 2020-02-13 RX ADMIN — MORPHINE SULFATE 2 MG: 2 INJECTION, SOLUTION INTRAMUSCULAR; INTRAVENOUS at 14:32

## 2020-02-13 RX ADMIN — HYDROMORPHONE HYDROCHLORIDE 0.5 MG: 1 INJECTION, SOLUTION INTRAMUSCULAR; INTRAVENOUS; SUBCUTANEOUS at 21:22

## 2020-02-13 RX ADMIN — ONDANSETRON 4 MG: 2 INJECTION INTRAMUSCULAR; INTRAVENOUS at 20:54

## 2020-02-13 NOTE — CONSULTS
Gastroenterology Consult     Referring Physician: Dr. Bre Adhikari Date: 2020     Subjective:     Chief Complaint: abd pain, nausea, vomiting    History of Present Illness: Clarice Mcdonald is a 53 y.o. male who is seen in consultation for cirrhosis. Transferred from [facility] for \"acute BERKOWITZ.\"  . Previously saw GI in [place] when hospitalized for 4 days for pancreatitis at age 32. Has had recurrent pancreatitis approximately 25 times since then. He doesn't have insurance. He typically goes to ER for fluids. Sometimes he is kept overnight. Once had an 8-day hospital stay and his GB was removed (14 yrs ago). Last episode of pancreatitis was 1 year ago. Drinks ETOH daily. Used to drink \"a lot\" and has cut back. Used to drink a handle per day when his mom passed away. Now he drinks 3 cans of malt beverages per day between his 2 jobs. He has depression and anxiety. Has seen a psychiatrist in the past but doesn't have one now. Doesn't have health insurance and recently moved to the [facility]). Has been sober for 3 years in the past.  Mom  from metastatic breast cancer. Father was a drinker but has stopped. His liver is failing per patient. No FH of pancreatic cancer or pancreatitis. No smoking cigarettes. Used to chew snuff but quit. Vapes. Smoked maijuana in the past but not currently. No current drug use but has \"tried all kinds\" in the past.  On probation. No DUI. States he drinks to relax. Has anxiety and job is stressful. Came to [facility] with abd pain and swelling in legs from waist down. Was also vomiting. Sxs started [place] while ago. \"  Abd pain was \"pretty bad. \"  Hard to localize but reminded him of pancreatitis. Vomiting once or twice per day. . Just got diabetes medication yesterday and took first dose. Has high blood pressure and high cholesterol but doesn't always have medications. He goes to a free clinic when he can.     One person told him in the past there were some liver issues but he was sick at the time and was supposed to follow up as an outpatient. He called the number he was given but never got a call back. Last ETOH was yesterday morning. Not sure if he's ever had alcohol withdrawals. No seizures. Is feeling anxious presently. Past Medical History:   Diagnosis Date    Diabetes ([address]     Hernia, abdominal     Hypercholesteremia     Lyme disease     Pancreatitis     Restless leg syndrome      Past Surgical History:   Procedure Laterality Date    HX CHOLECYSTECTOMY        Family History   Problem Relation Age of Onset    Cancer Mother     Diabetes Mother     Hypertension Father      Social History     Tobacco Use    Smoking status: Former Smoker    Smokeless tobacco: Never Used   Substance Use Topics    Alcohol use: Yes     Comment: beer      No Known Allergies  Current Facility-Administered Medications   Medication Dose Route Frequency    sodium chloride (NS) flush 5-40 mL  5-40 mL IntraVENous Q8H    sodium chloride (NS) flush 5-40 mL  5-40 mL IntraVENous PRN    ondansetron (ZOFRAN) injection 4 mg  4 mg IntraVENous Q6H PRN    bisacodyL (DULCOLAX) tablet 5 mg  5 mg Oral DAILY PRN    LORazepam (ATIVAN) injection 1 mg  1 mg IntraVENous B5Z PRN    folic acid (FOLVITE) tablet 1 mg  1 mg Oral DAILY    thiamine mononitrate (B-1) tablet 100 mg  100 mg Oral DAILY        Review of Systems:  A detailed 10 organ review of systems is obtained with pertinent positives as listed in the History of Present Illness and Past Medical History. A detailed review of systems was performed as follows:  Constitutional:  Negative  Eyes:  No ocular sensitivity to the sun, blurred vision or double vision. ENMT:  No nose or sinus problems.   Respiratory: No coughing, wheezing or sob  Cardiac:  No chest pain, exertional chest pain or palpitations  Gastrointestinal:  See history of the present illness  :   No pain with urination or hematuria  Musculoskeletal:  No arthritis or hot swollen joints. Endocrine:  + diabetes  Psychiatric: +depression and anxiety  Integumentary:  No skin rash or sensitivity to the sun. Neurologic:  +tremors  Heme-Lymphatic:  No history of anemia, no unexplained lumps or bumps    Objective:     Physical Exam:  Visit Vitals  /56   Pulse (!) 101   Temp 98.2 °F (36.8 °C)   Resp 16   Wt 92.5 kg (203 lb 14.8 oz)   SpO2 96%   BMI 32.91 kg/m²        Gen: obese white male, tremulous but NAD  Skin:  Extremities and face reveal no rashes. No robins erythema. No telangiectasias on the chest wall. HEENT: Sclerae anicteric. Cardiovascular: Regular rate and rhythm. No murmurs, gallops, or rubs. Respiratory:  Comfortable breathing with no accessory muscle use. Clear breath sounds with no wheezes, rales, or rhonchi. GI:  Abdomen is obese, firm upper abd, +BS, tenderness in epigastrium, no guarding or rebounding   Rectal:  Deferred  Musculoskeletal:  No pitting edema of the lower legs. Neurological:  Gross memory appears intact. Patient is alert and oriented. No asterixis  Psychiatric:  Appears anxious  Lymphatic:  No cervical or supraclavicular adenopathy. Lab/Data Review:  CT Results (most recent):  Results from Hospital Encounter encounter on 02/12/20   CT ABD PELV W CONT    Narrative CT ABDOMEN AND PELVIS WITH CONTRAST. 2/12/2020 6:03 PM     INDICATION: Right lower quadrant and upper abdominal pain. COMPARISON: 10/14/2019. TECHNIQUE: CT of the abdomen and pelvis was performed after the administration  100 cc IV Isovue-300. CT dose reduction was achieved through use of a  standardized protocol tailored for this examination and automatic exposure  control for dose modulation. FINDINGS:  Liver: Steatotic and enlarged (22.7 cm craniocaudad). Hypertrophy of the caudate  is the only finding to suggest cirrhosis.  However, the main portal vein is  dilated (17 mm), and there is splenomegaly (13.4 cm), trace ascites, and small  portosystemic collaterals in the left upper quadrant. Ascites and hepatomegaly  are new from 10/14/2019, suggesting acute nonalcoholic steatohepatitis (BERKOWITZ). Minimal peripancreatic fat stranding is likely extension of paracolic gutter  ascites as opposed to acute pancreatitis. Correlation with lipase is  recommended. Post cholecystectomy. The lung bases are clear. The heart size is  normal. The distal esophagus, stomach, duodenum, adrenals, and kidneys are  normal.    Pelvis: The small bowel, ileocecal junction, appendix, colon, and bladder are  normal. No free air and no abdominopelvic lymphadenopathy. Impression IMPRESSION:   1. Acute BERKOWITZ: new hepatomegaly and ascites associated with steatotic liver. 2. Portal hypertension. Morphologically equivocal cirrhosis. 3. Minimal peripancreatic fat stranding. Favor extension of ascites rather than  acute pancreatitis; correlation with lipase recommended. Lab Results   Component Value Date/Time    WBC 4.4 02/13/2020 04:10 AM    HGB 10.4 (L) 02/13/2020 04:10 AM    HCT 30.7 (L) 02/13/2020 04:10 AM    PLATELET 74 (L) 54/32/3625 04:10 AM    MCV 98.4 02/13/2020 04:10 AM     Lab Results   Component Value Date/Time    Sodium 134 (L) 02/13/2020 04:10 AM    Potassium 3.3 (L) 02/13/2020 04:10 AM    Chloride 95 (L) 02/13/2020 04:10 AM    CO2 29 02/13/2020 04:10 AM    Anion gap 10 02/13/2020 04:10 AM    Glucose 203 (H) 02/13/2020 04:10 AM    BUN 5 (L) 02/13/2020 04:10 AM    Creatinine 1.27 02/13/2020 04:10 AM    BUN/Creatinine ratio 4 (L) 02/13/2020 04:10 AM    GFR est AA >60 02/13/2020 04:10 AM    GFR est non-AA >60 02/13/2020 04:10 AM    Calcium 7.1 (L) 02/13/2020 04:10 AM    Bilirubin, total 1.4 (H) 02/13/2020 04:10 AM    AST (SGOT) 192 (H) 02/13/2020 04:10 AM    Alk.  phosphatase 147 (H) 02/13/2020 04:10 AM    Protein, total 6.4 02/13/2020 04:10 AM    Albumin 2.4 (L) 02/13/2020 04:10 AM    Globulin 4.0 02/13/2020 04:10 AM    A-G Ratio 0.6 (L) 02/13/2020 04:10 AM    ALT (SGPT) 102 (H) 02/13/2020 04:10 AM     Lab Results   Component Value Date/Time    INR 1.4 (H) 02/13/2020 05:32 AM    Prothrombin time 13.8 (H) 02/13/2020 05:32 AM         Assessment/Plan:     Acute alcoholic hepatitis  Alcohol induced pancreatitis, acute on chronic  Possible alcoholic cirrhosis     Recommend IVF and bowel rest for pancreatitis and CIWA protocol for possible alcohol withdrawal.  Maddrey's Discriminant Function: 18.9, good prognosis, <32 not likely to benefit from steroids  MELD-Na=18, 3-4% 90-day mortality rate. Will need outpatient follow up of liver disease and liver serologies as an outpatient. I had a annamaria discussion with patient about his liver disease and pancreatic disease and the importance of complete cessation of alcohol use. He seems committed. I feel he would benefit from psyc evaluation as he seems to be self mediating for anxiety.     Mora Goltz, PA  02/13/20  11:20 AM

## 2020-02-13 NOTE — PROGRESS NOTES
Bedside shift change report given to Wes Garcia (oncoming nurse) by Ham Cohn (offgoing nurse). Report included the following information SBAR, Kardex, Intake/Output, MAR, Accordion and Recent Results.

## 2020-02-13 NOTE — H&P
Hospitalist Admission Note    NAME: France Tyler   :  1975   MRN:  [id number]     Date/Time:  2020 4:33 AM    Patient PCP: None  ______________________________________________________________________  Given the patient's current clinical presentation, I have a high level of concern for decompensation if discharged from the emergency department. Complex decision making was performed, which includes reviewing the patient's available past medical records, laboratory results, and x-ray films. My assessment of this patient's clinical condition and my plan of care is as follows. Assessment / Plan:  Acute BERKOWITZ   Admit patient to telemetry   Monitor LFTs   GI consultation   Check hepatitis profile   Check ammonia level    Hypertension   Hold antihypertensive medication blood pressure low    DM  Hold oral hypoglycemic agentstart patient sliding scale insulin    Alcohol abuse  Start patient with CIWA protocol        Code Status: Full   Surrogate Decision Maker:Caryl Siddiqi    DVT Prophylaxis: SCD  GI Prophylaxis: not indicated          Subjective:   CHIEF COMPLAINT: nausea and vomiting     HISTORY OF PRESENT ILLNESS:     53years old male with past medical history significant for hypertension, DM, hyperlipidemia was transferred from [facility] for evaluation of acute Jimy Shackle, patient was a presented to the hospital for evaluation of abdominal pain and distention associated with bilateral leg swelling associated with nausea and vomiting started about 1 week ago, CT abdomen was done and show acute Berkowitz new hepatomegaly and ascites associated with steatotic liver, portal hypertension, liver enzyme was noticed to be elevated. We were asked to admit for work up and evaluation of the above problems.      Past Medical History:   Diagnosis Date    Diabetes ([address]     Hernia, abdominal     Hypercholesteremia     Lyme disease     Pancreatitis     Restless leg syndrome         Past Surgical History:   Procedure Laterality Date    HX CHOLECYSTECTOMY         Social History     Tobacco Use    Smoking status: Former Smoker    Smokeless tobacco: Never Used   Substance Use Topics    Alcohol use: Yes     Comment: beer        Family History   Problem Relation Age of Onset    Cancer Mother     Diabetes Mother     Hypertension Father      No Known Allergies     Prior to Admission medications    Medication Sig Start Date End Date Taking? Authorizing Provider   lisinopril (PRINIVIL, ZESTRIL) 5 mg tablet Take 1 Tab by mouth daily. 2/11/20   Marianna Jonhson MD   metFORMIN (GLUCOPHAGE) 500 mg tablet Take 2 Tabs by mouth two (2) times daily (with meals). 2/11/20   Marianna Johnson MD   atorvastatin (LIPITOR) 20 mg tablet Take 1 Tab by mouth daily. 2/11/20   Marianna Johnson MD   ondansetron (ZOFRAN ODT) 4 mg disintegrating tablet Take 1 Tab by mouth every eight (8) hours as needed for Nausea. 10/14/19   Nolan White MD       REVIEW OF SYSTEMS:     I am not able to complete the review of systems because:    The patient is intubated and sedated    The patient has altered mental status due to his acute medical problems    The patient has baseline aphasia from prior stroke(s)    The patient has baseline dementia and is not reliable historian    The patient is in acute medical distress and unable to provide information           Total of 12 systems reviewed as follows:       POSITIVE= underlined text  Negative = text not underlined  General:  fever, chills, sweats, generalized weakness, weight loss/gain,      loss of appetite   Eyes:    blurred vision, eye pain, loss of vision, double vision  ENT:    rhinorrhea, pharyngitis   Respiratory:   cough, sputum production, SOB, CHANG, wheezing, pleuritic pain   Cardiology:   chest pain, palpitations, orthopnea, PND, edema, syncope   Gastrointestinal:  abdominal pain , N/V, diarrhea, dysphagia, constipation, bleeding   Genitourinary:  frequency, urgency, dysuria, hematuria, incontinence   Muskuloskeletal :  arthralgia, myalgia, back pain  Hematology:  easy bruising, nose or gum bleeding, lymphadenopathy   Dermatological: rash, ulceration, pruritis, color change / jaundice  Endocrine:   hot flashes or polydipsia   Neurological:  headache, dizziness, confusion, focal weakness, paresthesia,     Speech difficulties, memory loss, gait difficulty  Psychological: Feelings of anxiety, depression, agitation    Objective:   VITALS:    Visit Vitals  /82 (BP 1 Location: Left arm, BP Patient Position: At rest)   Pulse (!) 110   Temp 97.8 °F (36.6 °C)   Resp 18   Wt 92.5 kg (203 lb 14.8 oz)   SpO2 96%   BMI 32.91 kg/m²       PHYSICAL EXAM:    General:    Alert, cooperative, no distress, appears stated age. HEENT: Atraumatic, anicteric sclerae, pink conjunctivae     No oral ulcers, mucosa moist, throat clear, dentition fair  Neck:  Supple, symmetrical,  thyroid: non tender  Lungs:   Clear to auscultation bilaterally. No Wheezing or Rhonchi. No rales. Chest wall:  No tenderness  No Accessory muscle use. Heart:   Regular  rhythm,  No  murmur   B/l  edema  Abdomen:   Soft, non-tender. distended. Bowel sounds normal  Extremities: No cyanosis. No clubbing,      Skin turgor normal, Capillary refill normal, Radial dial pulse 2+  Skin:     Not pale. Not Jaundiced  No rashes   Psych:  Good insight. Not depressed. Not anxious or agitated. Neurologic: EOMs intact. No facial asymmetry. No aphasia or slurred speech. Symmetrical strength, Sensation grossly intact.  Alert and oriented X 4.     _______________________________________________________________________  Care Plan discussed with:    Comments   Patient y    Family      RN y    Care Manager                    Consultant:      _______________________________________________________________________  Expected  Disposition:   Home with Family y   HH/PT/OT/RN    SNF/LTC    ELIANE ________________________________________________________________________  TOTAL TIME:  60  Minutes    Critical Care Provided     Minutes non procedure based      Comments    y Reviewed previous records   >50% of visit spent in counseling and coordination of care y Discussion with patient and/or family and questions answered       ________________________________________________________________________  Signed: Misty Licona MD    Procedures: see electronic medical records for all procedures/Xrays and details which were not copied into this note but were reviewed prior to creation of Plan. LAB DATA REVIEWED:    Recent Results (from the past 24 hour(s))   CBC WITH AUTOMATED DIFF    Collection Time: 02/12/20  5:45 PM   Result Value Ref Range    WBC 5.0 4.1 - 11.1 K/uL    RBC 3.11 (L) 4.10 - 5.70 M/uL    HGB 10.6 (L) 12.1 - 17.0 g/dL    HCT 30.4 (L) 36.6 - 50.3 %    MCV 97.7 80.0 - 99.0 FL    MCH 34.1 (H) 26.0 - 34.0 PG    MCHC 34.9 30.0 - 36.5 g/dL    RDW 14.9 (H) 11.5 - 14.5 %    PLATELET 88 (L) 420 - 400 K/uL    MPV 11.5 8.9 - 12.9 FL    NEUTROPHILS 54 32 - 75 %    LYMPHOCYTES 38 12 - 49 %    MONOCYTES 8 5 - 13 %    EOSINOPHILS 0 0 - 7 %    BASOPHILS 0 0 - 1 %    IMMATURE GRANULOCYTES 0 0.0 - 0.5 %    ABS. NEUTROPHILS 2.7 1.8 - 8.0 K/UL    ABS. LYMPHOCYTES 1.9 0.8 - 3.5 K/UL    ABS. MONOCYTES 0.4 0.0 - 1.0 K/UL    ABS. EOSINOPHILS 0.0 0.0 - 0.4 K/UL    ABS. BASOPHILS 0.0 0.0 - 0.1 K/UL    ABS. IMM.  GRANS. 0.0 0.00 - 0.04 K/UL    RBC COMMENTS ANISOCYTOSIS  2+       METABOLIC PANEL, COMPREHENSIVE    Collection Time: 02/12/20  5:45 PM   Result Value Ref Range    Sodium 134 (L) 136 - 145 mmol/L    Potassium 2.9 (L) 3.5 - 5.1 mmol/L    Chloride 94 (L) 97 - 108 mmol/L    CO2 29 21 - 32 mmol/L    Anion gap 11 5 - 15 mmol/L    Glucose 249 (H) 65 - 100 mg/dL    BUN 6 6 - 20 MG/DL    Creatinine 1.42 (H) 0.70 - 1.30 MG/DL    BUN/Creatinine ratio 4 (L) 12 - 20      GFR est AA >60 >60 ml/min/1.73m2    GFR est non-AA 54 (L) >60 ml/min/1.73m2    Calcium 7.1 (L) 8.5 - 10.1 MG/DL    Bilirubin, total 0.8 0.2 - 1.0 MG/DL    ALT (SGPT) 90 (H) 12 - 78 U/L    AST (SGOT) 158 (H) 15 - 37 U/L    Alk.  phosphatase 168 (H) 45 - 117 U/L    Protein, total 6.3 (L) 6.4 - 8.2 g/dL    Albumin 2.5 (L) 3.5 - 5.0 g/dL    Globulin 3.8 2.0 - 4.0 g/dL    A-G Ratio 0.7 (L) 1.1 - 2.2     LACTIC ACID    Collection Time: 02/12/20  5:45 PM   Result Value Ref Range    Lactic acid 3.6 (HH) 0.4 - 2.0 MMOL/L   LIPASE    Collection Time: 02/12/20  5:45 PM   Result Value Ref Range    Lipase 233 73 - 393 U/L   CK    Collection Time: 02/12/20  5:45 PM   Result Value Ref Range     39 - 308 U/L   DRUG SCREEN, URINE    Collection Time: 02/12/20  6:30 PM   Result Value Ref Range    AMPHETAMINES NEGATIVE  NEG      BARBITURATES NEGATIVE  NEG      BENZODIAZEPINES NEGATIVE  NEG      COCAINE NEGATIVE  NEG      OPIATES POSITIVE (A) NEG      PCP(PHENCYCLIDINE) NEGATIVE  NEG      THC (TH-CANNABINOL) NEGATIVE  NEG      TRICYCLICS NEGATIVE  NEG      DRUG SCREEN COMMENT (NOTE)    URINALYSIS W/ RFLX MICROSCOPIC    Collection Time: 02/12/20  6:32 PM   Result Value Ref Range    Color YELLOW/STRAW (A) YEL      Appearance CLEAR CLEAR      Specific gravity 1.010 1.003 - 1.030      pH (UA) 6.5 5.0 - 8.0      Protein NEGATIVE  NEG mg/dL    Glucose 250 (A) NEG mg/dL    Ketone NEGATIVE  NEG mg/dL    Bilirubin NEGATIVE  NEG      Blood NEGATIVE  NEG      Urobilinogen 0.2 0.2 - 1.0 EU/dL    Nitrites NEGATIVE  NEG      Leukocyte Esterase NEGATIVE  NEG     LACTIC ACID    Collection Time: 02/12/20 10:15 PM   Result Value Ref Range    Lactic acid 3.1 (HH) 0.4 - 2.0 MMOL/L   CBC WITH AUTOMATED DIFF    Collection Time: 02/13/20  4:10 AM   Result Value Ref Range    WBC 4.4 4.1 - 11.1 K/uL    RBC 3.12 (L) 4.10 - 5.70 M/uL    HGB 10.4 (L) 12.1 - 17.0 g/dL    HCT 30.7 (L) 36.6 - 50.3 %    MCV 98.4 80.0 - 99.0 FL    MCH 33.3 26.0 - 34.0 PG    MCHC 33.9 30.0 - 36.5 g/dL    RDW 15.2 (H) 11.5 - 14.5 % PLATELET 74 (L) 469 - 400 K/uL    MPV 11.5 8.9 - 12.9 FL    NRBC 0.0 0  WBC    ABSOLUTE NRBC 0.00 0.00 - 0.01 K/uL    NEUTROPHILS 47 32 - 75 %    LYMPHOCYTES 45 12 - 49 %    MONOCYTES 8 5 - 13 %    EOSINOPHILS 0 0 - 7 %    BASOPHILS 1 0 - 1 %    IMMATURE GRANULOCYTES 0 0.0 - 0.5 %    ABS. NEUTROPHILS 2.1 1.8 - 8.0 K/UL    ABS. LYMPHOCYTES 2.0 0.8 - 3.5 K/UL    ABS. MONOCYTES 0.3 0.0 - 1.0 K/UL    ABS. EOSINOPHILS 0.0 0.0 - 0.4 K/UL    ABS. BASOPHILS 0.0 0.0 - 0.1 K/UL    ABS. IMM.  GRANS. 0.0 0.00 - 0.04 K/UL    DF AUTOMATED

## 2020-02-13 NOTE — PROGRESS NOTES
Bedside and Verbal shift change report given to Millicent ARNOLD (oncoming nurse) by Kory Delaney (offgoing nurse). Report included the following information SBAR, Kardex, Procedure Summary, Intake/Output, Accordion and Recent Results.

## 2020-02-13 NOTE — PROGRESS NOTES
2233 TRANSFER - IN REPORT:    Verbal report received from [facility] RN(name) on Jazz Soler  being received from [facility] Emergency Department(unit) for routine progression of care      Report consisted of patients Situation, Background, Assessment and   Recommendations(SBAR). Information from the following report(s) SBAR, ED Summary, [facility] and Recent Results was reviewed with the receiving nurse. Opportunity for questions and clarification was provided. 0101 Assessment completed upon patients arrival to unit and care assumed. Pt is alert and oriented to person, place, time and situation, pt became nauseous in route and vomited, assisted pt in cleaning up, pt complains of pain but there are no orders at this time. 0110 Informed nursing supervisor that pt had arrived. 5786 pt admitted and pulled into census    0131 pt resting in bed    0138 spoke with nursing supervisor and was [facility] Dr. Sabina William to inform that pt is now admitted.     1242 Dr Sabina William at bedside, spoke with MD regarding pt's MEWS of 3 for elevated heart rate.      0321 CIWA score of 9, give PRN ativan per the [facility]    0420 CIWA score of 2.    0606 Ciwa score of 8, give PRN ativan per the [facility]

## 2020-02-13 NOTE — PROGRESS NOTES
Hospitalist Progress Note    NAME: Katelyn Schwartz   :  1975   MRN:  [id number]       Assessment / Plan:  Acute BERKOWITZ   Admitted  patient to telemetry   Monitor LFTs   GI consultation   Check hepatitis profile   Check ammonia level, elevated      Hypertension   Hold antihypertensive medication blood pressure low     DM  Hold oral hypoglycemic agentstart patient sliding scale insulin     Alcohol abuse  Start patient with CIWA protocol           Code Status: Full   Surrogate Decision Maker:Caryl Siddiqi     DVT Prophylaxis: SCD  GI Prophylaxis: not indicated     Subjective:     Chief Complaint / Reason for Physician Visit  \"still has abdominal pain and leg swelling \". Discussed with RN events overnight. Review of Systems:  Symptom Y/N Comments  Symptom Y/N Comments   Fever/Chills    Chest Pain     Poor Appetite    Edema     Cough    Abdominal Pain     Sputum    Joint Pain     SOB/CHANG    Pruritis/Rash     Nausea/vomit    Tolerating PT/OT     Diarrhea    Tolerating Diet     Constipation    Other       Could NOT obtain due to:      Objective:     VITALS:   Last 24hrs VS reviewed since prior progress note. Most recent are:  Patient Vitals for the past 24 hrs:   Temp Pulse Resp BP SpO2   20 0802 98.2 °F (36.8 °C) (!) 101 16 138/56 96 %   20 0248 97.8 °F (36.6 °C) (!) 110 18 114/82 96 %   20 0101 98.4 °F (36.9 °C) (!) 117 20 142/84 96 %       Intake/Output Summary (Last 24 hours) at 2020 1035  Last data filed at 2020 0606  Gross per 24 hour   Intake 200 ml   Output 300 ml   Net -100 ml        PHYSICAL EXAM:  General: WD, WN. Alert, cooperative, no acute distress    EENT:  EOMI. Anicteric sclerae. MMM  Resp:  CTA bilaterally, no wheezing or rales.   No accessory muscle use  CV:  Regular  rhythm,  2+ edema bilateral   GI:  Soft, Non distended, Non tender.  +Bowel sounds  Neurologic:  Alert and oriented X 3, normal speech,   Psych:   Good insight. Not anxious nor agitated  Skin:  No rashes. No jaundice    Reviewed most current lab test results and cultures  YES  Reviewed most current radiology test results   YES  Review and summation of old records today    NO  Reviewed patient's current orders and MAR    YES  PMH/SH reviewed - no change compared to H&P  ________________________________________________________________________  Care Plan discussed with:    Comments   Patient     Family      RN     Care Manager     Consultant                        Multidiciplinary team rounds were held today with , nursing, pharmacist and clinical coordinator. Patient's plan of care was discussed; medications were reviewed and discharge planning was addressed. ________________________________________________________________________  Total NON critical care TIME:  40   Minutes    Total CRITICAL CARE TIME Spent:   Minutes non procedure based      Comments   >50% of visit spent in counseling and coordination of care     ________________________________________________________________________  Asif Drake MD     Procedures: see electronic medical records for all procedures/Xrays and details which were not copied into this note but were reviewed prior to creation of Plan. LABS:  I reviewed today's most current labs and imaging studies.   Pertinent labs include:  Recent Labs     02/13/20 0410 02/12/20 1745 02/11/20  1009   WBC 4.4 5.0 3.5*   HGB 10.4* 10.6* 11.2*   HCT 30.7* 30.4* 32.5*   PLT 74* 88* 77*     Recent Labs     02/13/20  0532 02/13/20 0410 02/12/20 1745 02/11/20  1009   NA  --  134* 134* 134*   K  --  3.3* 2.9* 2.9*   CL  --  95* 94* 95*   CO2  --  29 29 27   GLU  --  203* 249* 311*   BUN  --  5* 6 3*   CREA  --  1.27 1.42* 0.88   CA  --  7.1* 7.1* 7.6*   ALB  --  2.4* 2.5* 2.5*   TBILI  --  1.4* 0.8 0.9   SGOT  --  192* 158* 168*   ALT  --  102* 90* 110*   INR 1.4*  --   --   --        Signed: Asif Drake MD

## 2020-02-14 ENCOUNTER — APPOINTMENT (OUTPATIENT)
Dept: NON INVASIVE DIAGNOSTICS | Age: 45
DRG: 432 | End: 2020-02-14
Attending: INTERNAL MEDICINE
Payer: SELF-PAY

## 2020-02-14 LAB
ALBUMIN SERPL-MCNC: 2.3 G/DL (ref 3.5–5)
ALBUMIN/GLOB SERPL: 0.6 {RATIO} (ref 1.1–2.2)
ALP SERPL-CCNC: 222 U/L (ref 45–117)
ALT SERPL-CCNC: 74 U/L (ref 12–78)
ANION GAP SERPL CALC-SCNC: 7 MMOL/L (ref 5–15)
AST SERPL-CCNC: 114 U/L (ref 15–37)
ATRIAL RATE: 91 BPM
AV VELOCITY RATIO: 0.91
BASOPHILS # BLD: 0 K/UL (ref 0–0.1)
BASOPHILS NFR BLD: 0 % (ref 0–1)
BILIRUB DIRECT SERPL-MCNC: 0.3 MG/DL (ref 0–0.2)
BILIRUB SERPL-MCNC: 1.1 MG/DL (ref 0.2–1)
BUN SERPL-MCNC: 6 MG/DL (ref 6–20)
BUN/CREAT SERPL: 6 (ref 12–20)
CALCIUM SERPL-MCNC: 7.2 MG/DL (ref 8.5–10.1)
CALCULATED P AXIS, ECG09: 47 DEGREES
CALCULATED R AXIS, ECG10: -7 DEGREES
CALCULATED T AXIS, ECG11: 46 DEGREES
CHLORIDE SERPL-SCNC: 96 MMOL/L (ref 97–108)
CO2 SERPL-SCNC: 33 MMOL/L (ref 21–32)
CREAT SERPL-MCNC: 1.05 MG/DL (ref 0.7–1.3)
DIAGNOSIS, 93000: NORMAL
DIFFERENTIAL METHOD BLD: ABNORMAL
ECHO AO ROOT DIAM: 3.64 CM
ECHO AV AREA PEAK VELOCITY: 4.1 CM2
ECHO AV AREA/BSA PEAK VELOCITY: 2 CM2/M2
ECHO AV PEAK GRADIENT: 5.1 MMHG
ECHO AV PEAK VELOCITY: 113.41 CM/S
ECHO EST RA PRESSURE: 10 MMHG
ECHO LA AREA 4C: 16.7 CM2
ECHO LA MAJOR AXIS: 3.93 CM
ECHO LA TO AORTIC ROOT RATIO: 1.08
ECHO LA VOL 4C: 44.9 ML (ref 18–58)
ECHO LV E' LATERAL VELOCITY: 19.36 CM/S
ECHO LV E' SEPTAL VELOCITY: 10.18 CM/S
ECHO LV EDV TEICHHOLZ: 0.41 ML
ECHO LV ESV TEICHHOLZ: 0.22 ML
ECHO LV INTERNAL DIMENSION DIASTOLIC: 3.96 CM (ref 4.2–5.9)
ECHO LV INTERNAL DIMENSION SYSTOLIC: 3.04 CM
ECHO LV IVSD: 1.68 CM (ref 0.6–1)
ECHO LV MASS 2D: 258.5 G (ref 88–224)
ECHO LV POSTERIOR WALL DIASTOLIC: 1.23 CM (ref 0.6–1)
ECHO LV POSTERIOR WALL SYSTOLIC: 1.12 CM
ECHO LVOT DIAM: 2.4 CM
ECHO LVOT PEAK GRADIENT: 4.2 MMHG
ECHO LVOT PEAK VELOCITY: 103.05 CM/S
ECHO LVOT SV: 97.5 ML
ECHO LVOT VTI: 21.63 CM
ECHO MV A VELOCITY: 76.15 CM/S
ECHO MV E DECELERATION TIME (DT): 111.8 MS
ECHO MV E VELOCITY: 83.17 CM/S
ECHO MV E/A RATIO: 1.09
ECHO MV E/E' LATERAL: 4.3
ECHO MV E/E' RATIO (AVERAGED): 6.23
ECHO MV E/E' SEPTAL: 8.17
ECHO MV REGURGITANT RADIUS PISA: 0.74 CM
ECHO PULMONARY ARTERY SYSTOLIC PRESSURE (PASP): 38.5 MMHG
ECHO RA AREA 4C: 12.18 CM2
ECHO RIGHT VENTRICULAR SYSTOLIC PRESSURE (RVSP): 38.5 MMHG
ECHO TV REGURGITANT MAX VELOCITY: 266.96 CM/S
ECHO TV REGURGITANT PEAK GRADIENT: 28.5 MMHG
EOSINOPHIL # BLD: 0 K/UL (ref 0–0.4)
EOSINOPHIL NFR BLD: 1 % (ref 0–7)
ERYTHROCYTE [DISTWIDTH] IN BLOOD BY AUTOMATED COUNT: 15.1 % (ref 11.5–14.5)
GLOBULIN SER CALC-MCNC: 3.8 G/DL (ref 2–4)
GLUCOSE BLD STRIP.AUTO-MCNC: 235 MG/DL (ref 65–100)
GLUCOSE BLD STRIP.AUTO-MCNC: 241 MG/DL (ref 65–100)
GLUCOSE BLD STRIP.AUTO-MCNC: 244 MG/DL (ref 65–100)
GLUCOSE SERPL-MCNC: 247 MG/DL (ref 65–100)
HCT VFR BLD AUTO: 30.9 % (ref 36.6–50.3)
HGB BLD-MCNC: 10.2 G/DL (ref 12.1–17)
IMM GRANULOCYTES # BLD AUTO: 0 K/UL (ref 0–0.04)
IMM GRANULOCYTES NFR BLD AUTO: 0 % (ref 0–0.5)
INR PPP: 1.3 (ref 0.9–1.1)
LVFS 2D: 23.19 %
LVOT MG: 2.25 MMHG
LVOT MV: 0.69 CM/S
LVSV (TEICH): 15.53 ML
LYMPHOCYTES # BLD: 2.2 K/UL (ref 0.8–3.5)
LYMPHOCYTES NFR BLD: 60 % (ref 12–49)
MAGNESIUM SERPL-MCNC: 1.4 MG/DL (ref 1.6–2.4)
MCH RBC QN AUTO: 33 PG (ref 26–34)
MCHC RBC AUTO-ENTMCNC: 33 G/DL (ref 30–36.5)
MCV RBC AUTO: 100 FL (ref 80–99)
MONOCYTES # BLD: 0.3 K/UL (ref 0–1)
MONOCYTES NFR BLD: 8 % (ref 5–13)
MV DEC SLOPE: 7.44
NEUTS SEG # BLD: 1.2 K/UL (ref 1.8–8)
NEUTS SEG NFR BLD: 31 % (ref 32–75)
NRBC # BLD: 0 K/UL (ref 0–0.01)
NRBC BLD-RTO: 0 PER 100 WBC
P-R INTERVAL, ECG05: 176 MS
PLATELET # BLD AUTO: 71 K/UL (ref 150–400)
PMV BLD AUTO: 12 FL (ref 8.9–12.9)
POTASSIUM SERPL-SCNC: 3.5 MMOL/L (ref 3.5–5.1)
PROT SERPL-MCNC: 6.1 G/DL (ref 6.4–8.2)
PROTHROMBIN TIME: 12.8 SEC (ref 9–11.1)
Q-T INTERVAL, ECG07: 364 MS
QRS DURATION, ECG06: 84 MS
QTC CALCULATION (BEZET), ECG08: 447 MS
RBC # BLD AUTO: 3.09 M/UL (ref 4.1–5.7)
SERVICE CMNT-IMP: ABNORMAL
SODIUM SERPL-SCNC: 136 MMOL/L (ref 136–145)
VENTRICULAR RATE, ECG03: 91 BPM
WBC # BLD AUTO: 3.7 K/UL (ref 4.1–11.1)

## 2020-02-14 PROCEDURE — 80048 BASIC METABOLIC PNL TOTAL CA: CPT

## 2020-02-14 PROCEDURE — 85025 COMPLETE CBC W/AUTO DIFF WBC: CPT

## 2020-02-14 PROCEDURE — 94760 N-INVAS EAR/PLS OXIMETRY 1: CPT

## 2020-02-14 PROCEDURE — 83735 ASSAY OF MAGNESIUM: CPT

## 2020-02-14 PROCEDURE — 93306 TTE W/DOPPLER COMPLETE: CPT

## 2020-02-14 PROCEDURE — 74011250637 HC RX REV CODE- 250/637: Performed by: INTERNAL MEDICINE

## 2020-02-14 PROCEDURE — 74011250636 HC RX REV CODE- 250/636: Performed by: INTERNAL MEDICINE

## 2020-02-14 PROCEDURE — 74011636637 HC RX REV CODE- 636/637: Performed by: INTERNAL MEDICINE

## 2020-02-14 PROCEDURE — 82962 GLUCOSE BLOOD TEST: CPT

## 2020-02-14 PROCEDURE — 85610 PROTHROMBIN TIME: CPT

## 2020-02-14 PROCEDURE — 93005 ELECTROCARDIOGRAM TRACING: CPT

## 2020-02-14 PROCEDURE — 65270000029 HC RM PRIVATE

## 2020-02-14 PROCEDURE — 74011250636 HC RX REV CODE- 250/636: Performed by: FAMILY MEDICINE

## 2020-02-14 PROCEDURE — 36415 COLL VENOUS BLD VENIPUNCTURE: CPT

## 2020-02-14 PROCEDURE — 80076 HEPATIC FUNCTION PANEL: CPT

## 2020-02-14 RX ORDER — FUROSEMIDE 40 MG/1
40 TABLET ORAL DAILY
Status: DISCONTINUED | OUTPATIENT
Start: 2020-02-14 | End: 2020-02-16 | Stop reason: HOSPADM

## 2020-02-14 RX ORDER — POTASSIUM CHLORIDE 750 MG/1
40 TABLET, FILM COATED, EXTENDED RELEASE ORAL
Status: COMPLETED | OUTPATIENT
Start: 2020-02-14 | End: 2020-02-14

## 2020-02-14 RX ORDER — MAGNESIUM SULFATE HEPTAHYDRATE 40 MG/ML
2 INJECTION, SOLUTION INTRAVENOUS ONCE
Status: COMPLETED | OUTPATIENT
Start: 2020-02-14 | End: 2020-02-14

## 2020-02-14 RX ORDER — INSULIN LISPRO 100 [IU]/ML
INJECTION, SOLUTION INTRAVENOUS; SUBCUTANEOUS
Status: DISCONTINUED | OUTPATIENT
Start: 2020-02-14 | End: 2020-02-16 | Stop reason: HOSPADM

## 2020-02-14 RX ORDER — MAGNESIUM SULFATE 1 G/100ML
1 INJECTION INTRAVENOUS ONCE
Status: COMPLETED | OUTPATIENT
Start: 2020-02-14 | End: 2020-02-14

## 2020-02-14 RX ORDER — MAGNESIUM SULFATE 100 %
4 CRYSTALS MISCELLANEOUS AS NEEDED
Status: DISCONTINUED | OUTPATIENT
Start: 2020-02-14 | End: 2020-02-16 | Stop reason: HOSPADM

## 2020-02-14 RX ORDER — IBUPROFEN 200 MG
200 TABLET ORAL
Status: DISCONTINUED | OUTPATIENT
Start: 2020-02-14 | End: 2020-02-16 | Stop reason: HOSPADM

## 2020-02-14 RX ORDER — DEXTROSE 50 % IN WATER (D50W) INTRAVENOUS SYRINGE
12.5-25 AS NEEDED
Status: DISCONTINUED | OUTPATIENT
Start: 2020-02-14 | End: 2020-02-16 | Stop reason: HOSPADM

## 2020-02-14 RX ORDER — POTASSIUM CHLORIDE 750 MG/1
20 TABLET, FILM COATED, EXTENDED RELEASE ORAL
Status: DISCONTINUED | OUTPATIENT
Start: 2020-02-14 | End: 2020-02-14

## 2020-02-14 RX ORDER — MORPHINE SULFATE 2 MG/ML
2 INJECTION, SOLUTION INTRAMUSCULAR; INTRAVENOUS
Status: DISCONTINUED | OUTPATIENT
Start: 2020-02-14 | End: 2020-02-16 | Stop reason: HOSPADM

## 2020-02-14 RX ADMIN — ONDANSETRON 4 MG: 2 INJECTION INTRAMUSCULAR; INTRAVENOUS at 06:03

## 2020-02-14 RX ADMIN — POTASSIUM CHLORIDE 40 MEQ: 750 TABLET, FILM COATED, EXTENDED RELEASE ORAL at 09:17

## 2020-02-14 RX ADMIN — Medication 10 ML: at 21:28

## 2020-02-14 RX ADMIN — Medication 10 ML: at 15:55

## 2020-02-14 RX ADMIN — Medication 100 MG: at 09:18

## 2020-02-14 RX ADMIN — INSULIN LISPRO 3 UNITS: 100 INJECTION, SOLUTION INTRAVENOUS; SUBCUTANEOUS at 12:02

## 2020-02-14 RX ADMIN — MORPHINE SULFATE 2 MG: 2 INJECTION, SOLUTION INTRAMUSCULAR; INTRAVENOUS at 21:20

## 2020-02-14 RX ADMIN — INSULIN LISPRO 2 UNITS: 100 INJECTION, SOLUTION INTRAVENOUS; SUBCUTANEOUS at 21:27

## 2020-02-14 RX ADMIN — INSULIN LISPRO 3 UNITS: 100 INJECTION, SOLUTION INTRAVENOUS; SUBCUTANEOUS at 16:51

## 2020-02-14 RX ADMIN — Medication 10 ML: at 06:03

## 2020-02-14 RX ADMIN — ONDANSETRON 4 MG: 2 INJECTION INTRAMUSCULAR; INTRAVENOUS at 21:20

## 2020-02-14 RX ADMIN — MAGNESIUM SULFATE HEPTAHYDRATE 1 G: 1 INJECTION, SOLUTION INTRAVENOUS at 11:36

## 2020-02-14 RX ADMIN — HYDROMORPHONE HYDROCHLORIDE 0.5 MG: 1 INJECTION, SOLUTION INTRAMUSCULAR; INTRAVENOUS; SUBCUTANEOUS at 10:06

## 2020-02-14 RX ADMIN — HYDROMORPHONE HYDROCHLORIDE 0.5 MG: 1 INJECTION, SOLUTION INTRAMUSCULAR; INTRAVENOUS; SUBCUTANEOUS at 01:02

## 2020-02-14 RX ADMIN — HYDROMORPHONE HYDROCHLORIDE 0.5 MG: 1 INJECTION, SOLUTION INTRAMUSCULAR; INTRAVENOUS; SUBCUTANEOUS at 06:03

## 2020-02-14 RX ADMIN — DIPHENHYDRAMINE HYDROCHLORIDE, ZINC ACETATE: 2; .1 CREAM TOPICAL at 15:53

## 2020-02-14 RX ADMIN — MORPHINE SULFATE 2 MG: 2 INJECTION, SOLUTION INTRAMUSCULAR; INTRAVENOUS at 18:09

## 2020-02-14 RX ADMIN — HYDROMORPHONE HYDROCHLORIDE 0.5 MG: 1 INJECTION, SOLUTION INTRAMUSCULAR; INTRAVENOUS; SUBCUTANEOUS at 14:15

## 2020-02-14 RX ADMIN — FOLIC ACID 1 MG: 1 TABLET ORAL at 09:17

## 2020-02-14 RX ADMIN — FUROSEMIDE 40 MG: 40 TABLET ORAL at 09:17

## 2020-02-14 RX ADMIN — MAGNESIUM SULFATE HEPTAHYDRATE 2 G: 40 INJECTION, SOLUTION INTRAVENOUS at 09:41

## 2020-02-14 NOTE — PROGRESS NOTES
Bedside shift change report given to Jennifer (oncoming nurse) by Marlen Birch (offgoing nurse). Report included the following information SBAR, Kardex, Intake/Output, MAR and Med Rec Status. Pt given dilaudid x3, zofran x2. Pt had run of vtach on and off for 8 seconds. Paged tele hospitalist, did EKG and put in for cardiac consult.

## 2020-02-14 NOTE — PROGRESS NOTES
Hospitalist Progress Note    NAME: Monica Lakhani   :  1975   MRN:  [id number]       Assessment / Plan:  Acute alcoholic hepatitis  Steatotic liver  Portal hypertension with hepatomegaly  -Not meeting criteria for  prednisone  -Counseled regarding alcohol cessation  -GI is following and recommends outpatient follow-up  -Start Lasix 40 mg daily    Chronic alcoholism concern for alcohol withdrawal  -CIWA score is 6  -Continue multivitamins. Continue Ativan per CIWA protocol    PVCs  -Replace magnesium and potassium. Recheck in a.m.  -Check echocardiogram  -Cardiology following    Hypertension  Diabetes mellitus type 2  Dyslipidemia  -Lisinopril was held due to elevated creatinine  -Holding metformin. Start insulin sliding scale with blood sugar checks  -Holding statin due to transaminitis    Acute thrombocytopenia likely related to alcohol  -Baseline platelets are around 200 and currently platelet count is around 70  -He was again counseled regarding alcohol cessation  -Check CBC in a.m.        Code Status: Full   Surrogate Decision Maker:Caryl Siddiqi     DVT Prophylaxis: SCD  GI Prophylaxis: not indicated     Subjective:     Chief Complaint / Reason for Physician Visit  Still reports abdominal pain which is mostly on the right side, 3 x 10, nonradiating. Denies nausea or vomiting  Denies diarrhea or constipation  Reports swelling of the legs    Review of Systems:  Symptom Y/N Comments  Symptom Y/N Comments   Fever/Chills    Chest Pain     Poor Appetite    Edema     Cough    Abdominal Pain     Sputum    Joint Pain     SOB/CHANG    Pruritis/Rash     Nausea/vomit    Tolerating PT/OT     Diarrhea    Tolerating Diet     Constipation    Other       Could NOT obtain due to:      Objective:     VITALS:   Last 24hrs VS reviewed since prior progress note.  Most recent are:  Patient Vitals for the past 24 hrs:   Temp Pulse Resp BP SpO2   20 1100 98.6 °F (37 °C) 94 19 144/85 94 %   20 0753 98 °F (36.7 °C) (!) 105 20 128/71 94 %   02/14/20 0315 97.9 °F (36.6 °C) 91 18 126/83 95 %   02/13/20 2226 97.6 °F (36.4 °C) 95 18 130/83 97 %   02/13/20 2017 98.7 °F (37.1 °C) (!) 102 17 125/89 96 %     No intake or output data in the 24 hours ending 02/14/20 1527     PHYSICAL EXAM:  General: WD, WN. Alert, cooperative, no acute distress    EENT:  EOMI. Anicteric sclerae. MMM  Resp:  CTA bilaterally, no wheezing or rales. No accessory muscle use  CV:  Regular  rhythm,  2+ edema bilateral   GI:  Soft, mild right upper quadrant tenderness, no guarding or rigidity  Neurologic:  Alert and oriented X 3, normal speech,   Psych:   Good insight. Not anxious nor agitated  Skin:  No rashes. No jaundice    Reviewed most current lab test results and cultures  YES  Reviewed most current radiology test results   YES  Review and summation of old records today    NO  Reviewed patient's current orders and MAR    YES  PMH/SH reviewed - no change compared to H&P  ________________________________________________________________________  Care Plan discussed with:    Comments   Patient  yes    Family      RN  yes    Care Manager     Consultant                        Multidiciplinary team rounds were held today with , nursing, pharmacist and clinical coordinator. Patient's plan of care was discussed; medications were reviewed and discharge planning was addressed. ________________________________________________________________________  Total NON critical care TIME:  35    Minutes    Total CRITICAL CARE TIME Spent:   Minutes non procedure based      Comments   >50% of visit spent in counseling and coordination of care     ________________________________________________________________________  Matheus Torres MD     Procedures: see electronic medical records for all procedures/Xrays and details which were not copied into this note but were reviewed prior to creation of Plan.       LABS:  I reviewed today's most current labs and imaging studies.   Pertinent labs include:  Recent Labs     02/14/20  0554 02/13/20  0410 02/12/20  1745   WBC 3.7* 4.4 5.0   HGB 10.2* 10.4* 10.6*   HCT 30.9* 30.7* 30.4*   PLT 71* 74* 88*     Recent Labs     02/14/20  0554 02/13/20  0532 02/13/20 0410 02/12/20  1745     --  134* 134*   K 3.5  --  3.3* 2.9*   CL 96*  --  95* 94*   CO2 33*  --  29 29   *  --  203* 249*   BUN 6  --  5* 6   CREA 1.05  --  1.27 1.42*   CA 7.2*  --  7.1* 7.1*   MG 1.4*  --   --   --    ALB 2.3*  --  2.4* 2.5*   TBILI 1.1*  --  1.4* 0.8   SGOT 114*  --  192* 158*   ALT 74  --  102* 90*   INR 1.3* 1.4*  --   --        Signed: Tomer Marie MD

## 2020-02-14 NOTE — PROGRESS NOTES
Reason for Admission: Non- alcoholic cirrhosis                    RUR Score:  LOW            Plan for utilizing home health: Not appropriate as pt is not homebound. Not eligible for a [facility] visit because he does not have a PCP. Current Advanced Directive/Advance Care Plan: None on file, pt not interested in arranging any at this time. Transition of Care Plan:                    Pt is a 53year old,  male, admitted with non-alcoholic cirrhosis. Pt was alert and oriented when meeting with CM, confirming address, emergency contact and PCP. Pt states he lives with his wife in a one level appt and is completely independent with ADL's. Pt states he has no DME and drives at baseline, working 2 jobs. Pt states he has not had HH or been to SNF/IPR in the past. Pt''s preferred pharmacy is the [facility] in [place], pt states he is not always able to afford his medications and just got back on diabetic medications after 3 months off of them. Pt states he does not have a PCP but has been going to a clinic in [place] as needed. Pt was willing to have CM arrange a new PCP appt with him. CM contacted CM specialist to arrange. CM also advised pt of the care card application which he was provided by [employer]. Pt's wife will drive pt home at time of discharge and as needed. CM will continue to follow pt and remain available for support throughout hospitalization stay. Care Management Interventions  PCP Verified by CM:  Yes  Mode of Transport at Discharge: Self  Transition of Care Consult (CM Consult): Discharge Planning  MyChart Signup: No  Discharge Durable Medical Equipment: No  Physical Therapy Consult: No  Occupational Therapy Consult: No  Speech Therapy Consult: No  Current Support Network: Lives with Spouse  Confirm Follow Up Transport: Family  The Patient and/or Patient Representative was Provided with a Choice of Provider and Agrees with the Discharge Plan?: Yes  Freedom of Choice List was Provided with Basic Dialogue that Supports the Patient's Individualized Plan of Care/Goals, Treatment Preferences and Shares the Quality Data Associated with the Providers?: Yes  Discharge Location  Discharge Placement: Home with family assistance     KATHERINE Leong, [address] [facility] Manager   [phone]

## 2020-02-14 NOTE — CONSULTS
[facility] [address] [facility]     Date of  Admission: 2/13/2020  1:24 AM     Admission type:Urgent    Consult for: VT  Consult by: hospitalist      Subjective:     Kei Evans is a 53 y.o. male with PMH HTN, HLD, DM, pancreatitis who was admitted for BERKOWIZT (nonalcoholic steatohepatitis) [K75.81]. Per ED provider note Kei Evans presented to [facility] as a transfer from [facility] with c/o abd pain, leg swelling, n/v and diagnosed with acute BERKOWITZ. Cardiology consulted for VT. Tele review does not show any VT. Rhythm sinus and rare PVC with a fair amount of artifact. On assessment, Kei Evans is resting in bed in NAD. He endorses the above information. He has some right sided abd pain. A large amount of LE edema, which he states has improved a lot already. Continued n/v. No chest pain or SOB or palpitations. Kei Evans  Does not have a cardiologist.  No prior records in our system. Cardiac risk factors: smoking/ tobacco exposure, male gender, hypertension.       Patient Active Problem List    Diagnosis Date Noted    BERKOWITZ (nonalcoholic steatohepatitis) 02/13/2020    Diabetes ([address] 09/23/2014    Pancreatitis 09/23/2014      None  Past Medical History:   Diagnosis Date    Diabetes ([address]     Hernia, abdominal     Hypercholesteremia     Lyme disease     Pancreatitis     Restless leg syndrome       Social History     Socioeconomic History    Marital status:      Spouse name: Not on file    Number of children: Not on file    Years of education: Not on file    Highest education level: Not on file   Tobacco Use    Smoking status: Former Smoker    Smokeless tobacco: Never Used   Substance and Sexual Activity    Alcohol use: Yes     Comment: beer    Drug use: No     No Known Allergies   Family History   Problem Relation Age of Onset    Cancer Mother     Diabetes Mother     Hypertension Father       Current Facility-Administered Medications   Medication Dose Route Frequency    furosemide (LASIX) tablet 40 mg  40 mg Oral DAILY    insulin lispro (HUMALOG) injection   SubCUTAneous AC&HS    glucose chewable tablet 16 g  4 Tab Oral PRN    dextrose (D50W) injection syrg 12.5-25 g  12.5-25 g IntraVENous PRN    glucagon (GLUCAGEN) injection 1 mg  1 mg IntraMUSCular PRN    magnesium sulfate 1 g/100 ml IVPB (premix or compounded)  1 g IntraVENous ONCE    ibuprofen (MOTRIN) tablet 200 mg  200 mg Oral Q6H PRN    sodium chloride (NS) flush 5-40 mL  5-40 mL IntraVENous Q8H    sodium chloride (NS) flush 5-40 mL  5-40 mL IntraVENous PRN    ondansetron (ZOFRAN) injection 4 mg  4 mg IntraVENous Q6H PRN    bisacodyL (DULCOLAX) tablet 5 mg  5 mg Oral DAILY PRN    LORazepam (ATIVAN) injection 1 mg  1 mg IntraVENous N2N PRN    folic acid (FOLVITE) tablet 1 mg  1 mg Oral DAILY    thiamine mononitrate (B-1) tablet 100 mg  100 mg Oral DAILY    HYDROmorphone (PF) (DILAUDID) injection 0.5 mg  0.5 mg IntraVENous Q4H PRN        Review of Symptoms:   11 systems reviewed, negative other than as stated in the HPI        Objective:      Visit Vitals  /85 (BP 1 Location: Left arm, BP Patient Position: Sitting)   Pulse 94   Temp 98.6 °F (37 °C)   Resp 19   Wt 92.5 kg (203 lb 14.8 oz)   SpO2 94%   BMI 32.91 kg/m²       Physical:   General: pleasant, obese  male resting in bed in NAD  Heart: RRR, no m/S3/JVD, no carotid bruits   Lungs: clear   Abdomen: Soft, +BS, NTND   Extremities: LE ilir +DP/PT, 3+ edema BLE  Neurologic: Grossly normal    Data Review:   Recent Labs     02/14/20  0554 02/13/20  0410 02/12/20  1745   WBC 3.7* 4.4 5.0   HGB 10.2* 10.4* 10.6*   HCT 30.9* 30.7* 30.4*   PLT 71* 74* 88*     Recent Labs     02/14/20  0554 02/13/20  0532 02/13/20  0410 02/12/20  1745     --  134* 134*   K 3.5  --  3.3* 2.9*   CL 96*  --  95* 94*   CO2 33*  --  29 29   *  --  203* 249*   BUN 6  --  5* 6   CREA 1.05  --  1.27 1.42*   CA 7.2*  --  7.1* 7.1*   MG 1.4* --   --   --    ALB 2.3*  --  2.4* 2.5*   TBILI 1.1*  --  1.4* 0.8   SGOT 114*  --  192* 158*   ALT 74  --  102* 90*   INR 1.3* 1.4*  --   --        Recent Labs     02/12/20  1745          No intake or output data in the 24 hours ending 02/14/20 1148     Cardiographics    Telemetry: SR with rare PVC. artifact  ECG: NSR  Echocardiogram: ordered  CXRAY:n/a       Assessment:       Active Problems:    BERKOWITZ (nonalcoholic steatohepatitis) (2/13/2020)         Plan:     Tatyana Orellana is a 53 y.o. male admitted for BERKOWITZ (nonalcoholic steatohepatitis) [K75.81]   Cardiology consulted for suspected VT on tele. Tele review does not show any VT. Rare PVC. A fair amount of artifact. Philip improved. Ammonia 57. LFTs improving. · ECHO pending  · No VT on tele review. If no new concerns on tele, no further cardiology work up. Thank you for consulting [address] [place] [address] [facility]    Iggy Fierro, NP  DNP, RN, AGACNP-[address] [facility]    2/14/2020         Patient seen, examined by me personally. Plan discussed as detailed. Agree with note as outlined by  NP. I confirm findings in history and physical exam. No additional findings noted. Agree with plan as outlined above. Patient asymptomatic. Telemetry does not show VT. Has PVC's. Check echo. If normal, no further cardiac evaluation needed.     Yasir Cordova MD

## 2020-02-14 NOTE — PROGRESS NOTES
Gastroenterology Daily Progress Note (Dr. Khoi Fuchs)   [facility]    Admit Date: 2/13/2020       Subjective:       Wants to go home. No n/v. Some mild RUQ pain  Wife in room. No confustion per wife. Current Facility-Administered Medications   Medication Dose Route Frequency    magnesium sulfate 2 g/50 ml IVPB (premix or compounded)  2 g IntraVENous ONCE    furosemide (LASIX) tablet 40 mg  40 mg Oral DAILY    insulin lispro (HUMALOG) injection   SubCUTAneous AC&HS    glucose chewable tablet 16 g  4 Tab Oral PRN    dextrose (D50W) injection syrg 12.5-25 g  12.5-25 g IntraVENous PRN    glucagon (GLUCAGEN) injection 1 mg  1 mg IntraMUSCular PRN    magnesium sulfate 1 g/100 ml IVPB (premix or compounded)  1 g IntraVENous ONCE    potassium chloride SR (KLOR-CON 10) tablet 40 mEq  40 mEq Oral NOW    sodium chloride (NS) flush 5-40 mL  5-40 mL IntraVENous Q8H    sodium chloride (NS) flush 5-40 mL  5-40 mL IntraVENous PRN    ondansetron (ZOFRAN) injection 4 mg  4 mg IntraVENous Q6H PRN    bisacodyL (DULCOLAX) tablet 5 mg  5 mg Oral DAILY PRN    LORazepam (ATIVAN) injection 1 mg  1 mg IntraVENous N5Y PRN    folic acid (FOLVITE) tablet 1 mg  1 mg Oral DAILY    thiamine mononitrate (B-1) tablet 100 mg  100 mg Oral DAILY    HYDROmorphone (PF) (DILAUDID) injection 0.5 mg  0.5 mg IntraVENous Q4H PRN        Objective:     Visit Vitals  /71 (BP 1 Location: Left arm, BP Patient Position: Sitting)   Pulse (!) 105   Temp 98 °F (36.7 °C)   Resp 20   Wt 92.5 kg (203 lb 14.8 oz)   SpO2 94%   BMI 32.91 kg/m²   Blood pressure 128/71, pulse (!) 105, temperature 98 °F (36.7 °C), resp. rate 20, weight 92.5 kg (203 lb 14.8 oz), SpO2 94 %. No intake/output data recorded. 02/12 1901 - 02/14 0700  In: 320 [P.O.:320]  Out: 300     No intake or output data in the 24 hours ending 02/14/20 0901      Physical Exam:       General: NAD  Chest:  CTA, No rhonchi, rales or rubs.   Heart: S1, S2, RRR  GI: Soft, NT, ND + bowel sounds  Extremities: Trace edema  CNS: no asterixis      Labs:       Recent Results (from the past 24 hour(s))   EKG, 12 LEAD, INITIAL    Collection Time: 02/14/20 12:56 AM   Result Value Ref Range    Ventricular Rate 91 BPM    Atrial Rate 91 BPM    P-R Interval 176 ms    QRS Duration 84 ms    Q-T Interval 364 ms    QTC Calculation (Bezet) 447 ms    Calculated P Axis 47 degrees    Calculated R Axis -7 degrees    Calculated T Axis 46 degrees    Diagnosis       Normal sinus rhythm  Normal ECG  No previous ECGs available     CBC WITH AUTOMATED DIFF    Collection Time: 02/14/20  5:54 AM   Result Value Ref Range    WBC 3.7 (L) 4.1 - 11.1 K/uL    RBC 3.09 (L) 4.10 - 5.70 M/uL    HGB 10.2 (L) 12.1 - 17.0 g/dL    HCT 30.9 (L) 36.6 - 50.3 %    .0 (H) 80.0 - 99.0 FL    MCH 33.0 26.0 - 34.0 PG    MCHC 33.0 30.0 - 36.5 g/dL    RDW 15.1 (H) 11.5 - 14.5 %    PLATELET 71 (L) 242 - 400 K/uL    MPV 12.0 8.9 - 12.9 FL    NRBC 0.0 0  WBC    ABSOLUTE NRBC 0.00 0.00 - 0.01 K/uL    NEUTROPHILS 31 (L) 32 - 75 %    LYMPHOCYTES 60 (H) 12 - 49 %    MONOCYTES 8 5 - 13 %    EOSINOPHILS 1 0 - 7 %    BASOPHILS 0 0 - 1 %    IMMATURE GRANULOCYTES 0 0.0 - 0.5 %    ABS. NEUTROPHILS 1.2 (L) 1.8 - 8.0 K/UL    ABS. LYMPHOCYTES 2.2 0.8 - 3.5 K/UL    ABS. MONOCYTES 0.3 0.0 - 1.0 K/UL    ABS. EOSINOPHILS 0.0 0.0 - 0.4 K/UL    ABS. BASOPHILS 0.0 0.0 - 0.1 K/UL    ABS. IMM.  GRANS. 0.0 0.00 - 0.04 K/UL    DF AUTOMATED     PROTHROMBIN TIME + INR    Collection Time: 02/14/20  5:54 AM   Result Value Ref Range    INR 1.3 (H) 0.9 - 1.1      Prothrombin time 12.8 (H) 9.0 - 44.0 sec   METABOLIC PANEL, BASIC    Collection Time: 02/14/20  5:54 AM   Result Value Ref Range    Sodium 136 136 - 145 mmol/L    Potassium 3.5 3.5 - 5.1 mmol/L    Chloride 96 (L) 97 - 108 mmol/L    CO2 33 (H) 21 - 32 mmol/L    Anion gap 7 5 - 15 mmol/L    Glucose 247 (H) 65 - 100 mg/dL    BUN 6 6 - 20 MG/DL    Creatinine 1.05 0.70 - 1.30 MG/DL BUN/Creatinine ratio 6 (L) 12 - 20      GFR est AA >60 >60 ml/min/1.73m2    GFR est non-AA >60 >60 ml/min/1.73m2    Calcium 7.2 (L) 8.5 - 10.1 MG/DL   MAGNESIUM    Collection Time: 02/14/20  5:54 AM   Result Value Ref Range    Magnesium 1.4 (L) 1.6 - 2.4 mg/dL   HEPATIC FUNCTION PANEL    Collection Time: 02/14/20  5:54 AM   Result Value Ref Range    Protein, total 6.1 (L) 6.4 - 8.2 g/dL    Albumin 2.3 (L) 3.5 - 5.0 g/dL    Globulin 3.8 2.0 - 4.0 g/dL    A-G Ratio 0.6 (L) 1.1 - 2.2      Bilirubin, total 1.1 (H) 0.2 - 1.0 MG/DL    Bilirubin, direct 0.3 (H) 0.0 - 0.2 MG/DL    Alk. phosphatase 222 (H) 45 - 117 U/L    AST (SGOT) 114 (H) 15 - 37 U/L    ALT (SGPT) 74 12 - 78 U/L   LABRCNT(wbc:2,hgb:2,hct:2,plt:2,)  Recent Labs     02/14/20 0554 02/13/20 0410 02/12/20  1745    134* 134*   K 3.5 3.3* 2.9*   CL 96* 95* 94*   CO2 33* 29 29   BUN 6 5* 6   CREA 1.05 1.27 1.42*   * 203* 249*   CA 7.2* 7.1* 7.1*   MG 1.4*  --   --      Recent Labs     02/14/20  0554 02/13/20  0532   INR 1.3* 1.4*   PTP 12.8* 13.8*     Recent Labs     02/14/20  0554 02/13/20 0410 02/12/20  1745 02/11/20  1009   SGOT 114* 192* 158* 168*   * 147* 168* 213*   TP 6.1* 6.4 6.3* 6.5   ALB 2.3* 2.4* 2.5* 2.5*   GLOB 3.8 4.0 3.8 4.0   LPSE  --  115 233 151     Impression:    Acute alcoholic hepatitis  Hepatomegaly  EtOH abuse  Chronic Pancreatitis         Plan:  Long standing EtOH abuse resulting in current presentation of acute hepatomegaly and alcoholic hepatitis. Treatment is nutrition, alcohol cessation and supportive care. He is convinced he will not drink EtOH after discharge and he will need to seek counseling after discharge near his home in [facility] to maintain sobriety. I suspect he has some advanced fibrosis and could have early cirrhosis and will need to follow up after discharge (should see Dr. Yani So at [facility] as new patient as well). Will see as needed over weekend, call with questions. Saul Davis MD    2/14/2020 [address] [facility], [address] [place] [address] [phone]  [facility] [place] [address] [place]: [phone]

## 2020-02-14 NOTE — PROGRESS NOTES
PER RN,\"Pt was admitted 6/50 for alcoholic hepatitis and hepatomegaly. At 2257 he had short runs of V-tach on and off for 8 seconds. Pt is currently sleeping and has no cardiac history. \"  Will order BMP, Mg, EKG ordered. cardio consult. continue to monitor.

## 2020-02-15 LAB
ALBUMIN SERPL-MCNC: 2.2 G/DL (ref 3.5–5)
ALBUMIN/GLOB SERPL: 0.6 {RATIO} (ref 1.1–2.2)
ALP SERPL-CCNC: 206 U/L (ref 45–117)
ALT SERPL-CCNC: 60 U/L (ref 12–78)
ANION GAP SERPL CALC-SCNC: 5 MMOL/L (ref 5–15)
AST SERPL-CCNC: 87 U/L (ref 15–37)
BASOPHILS # BLD: 0 K/UL (ref 0–0.1)
BASOPHILS NFR BLD: 1 % (ref 0–1)
BILIRUB SERPL-MCNC: 0.9 MG/DL (ref 0.2–1)
BUN SERPL-MCNC: 7 MG/DL (ref 6–20)
BUN/CREAT SERPL: 8 (ref 12–20)
CALCIUM SERPL-MCNC: 7.4 MG/DL (ref 8.5–10.1)
CHLORIDE SERPL-SCNC: 102 MMOL/L (ref 97–108)
CO2 SERPL-SCNC: 30 MMOL/L (ref 21–32)
CREAT SERPL-MCNC: 0.89 MG/DL (ref 0.7–1.3)
DIFFERENTIAL METHOD BLD: ABNORMAL
EOSINOPHIL # BLD: 0 K/UL (ref 0–0.4)
EOSINOPHIL NFR BLD: 1 % (ref 0–7)
ERYTHROCYTE [DISTWIDTH] IN BLOOD BY AUTOMATED COUNT: 15.1 % (ref 11.5–14.5)
GLOBULIN SER CALC-MCNC: 3.9 G/DL (ref 2–4)
GLUCOSE BLD STRIP.AUTO-MCNC: 177 MG/DL (ref 65–100)
GLUCOSE BLD STRIP.AUTO-MCNC: 191 MG/DL (ref 65–100)
GLUCOSE BLD STRIP.AUTO-MCNC: 194 MG/DL (ref 65–100)
GLUCOSE BLD STRIP.AUTO-MCNC: 251 MG/DL (ref 65–100)
GLUCOSE SERPL-MCNC: 165 MG/DL (ref 65–100)
HCT VFR BLD AUTO: 30.8 % (ref 36.6–50.3)
HGB BLD-MCNC: 10.3 G/DL (ref 12.1–17)
IMM GRANULOCYTES # BLD AUTO: 0 K/UL (ref 0–0.04)
IMM GRANULOCYTES NFR BLD AUTO: 0 % (ref 0–0.5)
LYMPHOCYTES # BLD: 1.8 K/UL (ref 0.8–3.5)
LYMPHOCYTES NFR BLD: 51 % (ref 12–49)
MAGNESIUM SERPL-MCNC: 1.8 MG/DL (ref 1.6–2.4)
MCH RBC QN AUTO: 33.9 PG (ref 26–34)
MCHC RBC AUTO-ENTMCNC: 33.4 G/DL (ref 30–36.5)
MCV RBC AUTO: 101.3 FL (ref 80–99)
MONOCYTES # BLD: 0.2 K/UL (ref 0–1)
MONOCYTES NFR BLD: 6 % (ref 5–13)
NEUTS SEG # BLD: 1.5 K/UL (ref 1.8–8)
NEUTS SEG NFR BLD: 42 % (ref 32–75)
NRBC # BLD: 0 K/UL (ref 0–0.01)
NRBC BLD-RTO: 0 PER 100 WBC
PHOSPHATE SERPL-MCNC: 2.9 MG/DL (ref 2.6–4.7)
PLATELET # BLD AUTO: 83 K/UL (ref 150–400)
PMV BLD AUTO: 11.4 FL (ref 8.9–12.9)
POTASSIUM SERPL-SCNC: 4.2 MMOL/L (ref 3.5–5.1)
PROT SERPL-MCNC: 6.1 G/DL (ref 6.4–8.2)
RBC # BLD AUTO: 3.04 M/UL (ref 4.1–5.7)
SERVICE CMNT-IMP: ABNORMAL
SODIUM SERPL-SCNC: 137 MMOL/L (ref 136–145)
WBC # BLD AUTO: 3.6 K/UL (ref 4.1–11.1)

## 2020-02-15 PROCEDURE — 74011636637 HC RX REV CODE- 636/637: Performed by: INTERNAL MEDICINE

## 2020-02-15 PROCEDURE — 83735 ASSAY OF MAGNESIUM: CPT

## 2020-02-15 PROCEDURE — 85027 COMPLETE CBC AUTOMATED: CPT

## 2020-02-15 PROCEDURE — 74011250636 HC RX REV CODE- 250/636: Performed by: INTERNAL MEDICINE

## 2020-02-15 PROCEDURE — 94760 N-INVAS EAR/PLS OXIMETRY 1: CPT

## 2020-02-15 PROCEDURE — 80053 COMPREHEN METABOLIC PANEL: CPT

## 2020-02-15 PROCEDURE — 84100 ASSAY OF PHOSPHORUS: CPT

## 2020-02-15 PROCEDURE — 65660000000 HC RM CCU STEPDOWN

## 2020-02-15 PROCEDURE — 36415 COLL VENOUS BLD VENIPUNCTURE: CPT

## 2020-02-15 PROCEDURE — 82962 GLUCOSE BLOOD TEST: CPT

## 2020-02-15 PROCEDURE — 74011250637 HC RX REV CODE- 250/637: Performed by: INTERNAL MEDICINE

## 2020-02-15 RX ORDER — CHLORDIAZEPOXIDE HYDROCHLORIDE 25 MG/1
25 CAPSULE, GELATIN COATED ORAL 3 TIMES DAILY
Status: DISCONTINUED | OUTPATIENT
Start: 2020-02-15 | End: 2020-02-16 | Stop reason: HOSPADM

## 2020-02-15 RX ORDER — LISINOPRIL 5 MG/1
5 TABLET ORAL DAILY
Status: DISCONTINUED | OUTPATIENT
Start: 2020-02-15 | End: 2020-02-16 | Stop reason: HOSPADM

## 2020-02-15 RX ADMIN — MORPHINE SULFATE 2 MG: 2 INJECTION, SOLUTION INTRAMUSCULAR; INTRAVENOUS at 23:31

## 2020-02-15 RX ADMIN — CHLORDIAZEPOXIDE HYDROCHLORIDE 25 MG: 25 CAPSULE ORAL at 10:04

## 2020-02-15 RX ADMIN — MORPHINE SULFATE 2 MG: 2 INJECTION, SOLUTION INTRAMUSCULAR; INTRAVENOUS at 07:46

## 2020-02-15 RX ADMIN — MORPHINE SULFATE 2 MG: 2 INJECTION, SOLUTION INTRAMUSCULAR; INTRAVENOUS at 11:25

## 2020-02-15 RX ADMIN — INSULIN LISPRO 2 UNITS: 100 INJECTION, SOLUTION INTRAVENOUS; SUBCUTANEOUS at 07:46

## 2020-02-15 RX ADMIN — FUROSEMIDE 40 MG: 40 TABLET ORAL at 08:23

## 2020-02-15 RX ADMIN — CHLORDIAZEPOXIDE HYDROCHLORIDE 25 MG: 25 CAPSULE ORAL at 22:32

## 2020-02-15 RX ADMIN — FOLIC ACID 1 MG: 1 TABLET ORAL at 08:23

## 2020-02-15 RX ADMIN — ONDANSETRON 4 MG: 2 INJECTION INTRAMUSCULAR; INTRAVENOUS at 04:06

## 2020-02-15 RX ADMIN — DIPHENHYDRAMINE HYDROCHLORIDE, ZINC ACETATE: 2; .1 CREAM TOPICAL at 07:49

## 2020-02-15 RX ADMIN — CHLORDIAZEPOXIDE HYDROCHLORIDE 25 MG: 25 CAPSULE ORAL at 16:47

## 2020-02-15 RX ADMIN — MORPHINE SULFATE 2 MG: 2 INJECTION, SOLUTION INTRAMUSCULAR; INTRAVENOUS at 00:54

## 2020-02-15 RX ADMIN — Medication 100 MG: at 08:23

## 2020-02-15 RX ADMIN — MORPHINE SULFATE 2 MG: 2 INJECTION, SOLUTION INTRAMUSCULAR; INTRAVENOUS at 14:42

## 2020-02-15 RX ADMIN — Medication 10 ML: at 16:47

## 2020-02-15 RX ADMIN — Medication 10 ML: at 22:32

## 2020-02-15 RX ADMIN — LISINOPRIL 5 MG: 5 TABLET ORAL at 11:25

## 2020-02-15 RX ADMIN — MORPHINE SULFATE 2 MG: 2 INJECTION, SOLUTION INTRAMUSCULAR; INTRAVENOUS at 20:20

## 2020-02-15 RX ADMIN — MORPHINE SULFATE 2 MG: 2 INJECTION, SOLUTION INTRAMUSCULAR; INTRAVENOUS at 04:06

## 2020-02-15 RX ADMIN — INSULIN LISPRO 5 UNITS: 100 INJECTION, SOLUTION INTRAVENOUS; SUBCUTANEOUS at 11:24

## 2020-02-15 RX ADMIN — INSULIN LISPRO 2 UNITS: 100 INJECTION, SOLUTION INTRAVENOUS; SUBCUTANEOUS at 16:46

## 2020-02-15 NOTE — PROGRESS NOTES
Bedside shift change report given to Jennifer (oncoming nurse) by Calista Pineda (offgoing nurse). Report included the following information SBAR, Kardex, Intake/Output, MAR and Recent Results. Morphine x 3 and zofran x 2 overnight. Labs drawn. Pain getting better, down to a 7/10 instead of 9/10.

## 2020-02-15 NOTE — PROGRESS NOTES
Appointment Information  The following appointments have been successfully scheduled:    Date/time Tuesday, February 18, 2020 08:00 AM  Patient Vern Millan 1975 (42MB M) #[id number] #[id number]  Department [facility] OFFICE  Appointment type New Patient  Provider Lial Mcintyre

## 2020-02-15 NOTE — PROGRESS NOTES
Pt refused Ativan with CIWA score 8. Education provided to pt and his wife of purpose of Ativan vs Morphine. However, pt requested his pain medication by saying \"I know what is work for me, I've been in rehab many times and now what is work for me \". Bedside shift change report given to [address] [place] [address] (oncoming nurse) by Edward Rivera RN (offgoing nurse). Report included the following information SBAR, Kardex, Procedure Summary, Intake/Output, MAR and Recent Results.

## 2020-02-15 NOTE — PROGRESS NOTES
Hospitalist Progress Note    NAME: Magdalena Zepeda   :  1975   MRN:  [id number]       Assessment / Plan:  Acute alcoholic hepatitis  Steatotic liver  Portal hypertension with hepatomegaly  -Not meeting criteria for  prednisone  -Counseled regarding alcohol cessation  -GI is following and recommends outpatient follow-up  -Continue Lasix 40 mg daily    Chronic alcoholism concern for alcohol withdrawal  -CIWA score is 6  -Continue multivitamins. he is refusing IV Ativan per protocol  -Start Librium    PVCs  -Electrolytes normal today. Echocardiogram is normal.  -Appreciate cards recommendations      Hypertension  Diabetes mellitus type 2  Dyslipidemia  -Resume home lisinopril  -Holding metformin. Start insulin sliding scale with blood sugar checks  -Holding statin due to transaminitis    Acute thrombocytopenia likely related to alcohol  -Baseline platelets are around 200 and currently platelet count is around 70  -He was again counseled regarding alcohol cessation          Code Status: Full   Surrogate Decision Maker:Caryl Siddiqi     DVT Prophylaxis: SCD  GI Prophylaxis: not indicated     Subjective:     Chief Complaint / Reason for Physician Visit  Abdominal pain is better. No nausea or vomiting. Still reports some anxiety    Review of Systems:  Symptom Y/N Comments  Symptom Y/N Comments   Fever/Chills    Chest Pain     Poor Appetite    Edema     Cough    Abdominal Pain     Sputum    Joint Pain     SOB/CHANG    Pruritis/Rash     Nausea/vomit    Tolerating PT/OT     Diarrhea    Tolerating Diet     Constipation    Other       Could NOT obtain due to:      Objective:     VITALS:   Last 24hrs VS reviewed since prior progress note.  Most recent are:  Patient Vitals for the past 24 hrs:   Temp Pulse Resp BP SpO2   02/15/20 1608 98.1 °F (36.7 °C) 85 14 121/72 97 %   02/15/20 1029 98 °F (36.7 °C) 82 16 145/80 96 %   02/15/20 0722 98.4 °F (36.9 °C) 88 12 (!) 142/93 95 %   02/15/20 0251 98.2 °F (36.8 °C) 82 16 150/82 98 %   02/14/20 2250 98.2 °F (36.8 °C) (!) 109 18 133/79 97 %   02/14/20 1935 98.5 °F (36.9 °C) (!) 106 18 122/84 98 %     No intake or output data in the 24 hours ending 02/15/20 1711     PHYSICAL EXAM:  General:  Alert, cooperative, no acute distress    EENT:  EOMI. Anicteric sclerae. MMM  Resp:  CTA bilaterally, no wheezing or rales. No accessory muscle use  CV:  Regular  rhythm,  2+ edema bilateral   GI:  Soft, nontender, no guarding or rigidity  Neurologic:  Alert and oriented X 3, normal speech,   Psych:   Not anxious nor agitated  Skin:  No rashes. No jaundice    Reviewed most current lab test results and cultures  YES  Reviewed most current radiology test results   YES  Review and summation of old records today    NO  Reviewed patient's current orders and MAR    YES  PMH/ reviewed - no change compared to H&P  ________________________________________________________________________  Care Plan discussed with:    Comments   Patient  yes    Family      RN  yes    Care Manager     Consultant                        Multidiciplinary team rounds were held today with , nursing, pharmacist and clinical coordinator. Patient's plan of care was discussed; medications were reviewed and discharge planning was addressed. ________________________________________________________________________  Total NON critical care TIME:  35    Minutes    Total CRITICAL CARE TIME Spent:   Minutes non procedure based      Comments   >50% of visit spent in counseling and coordination of care     ________________________________________________________________________  Ailyn Youssef MD     Procedures: see electronic medical records for all procedures/Xrays and details which were not copied into this note but were reviewed prior to creation of Plan. LABS:  I reviewed today's most current labs and imaging studies.   Pertinent labs include:  Recent Labs     02/15/20  0416 02/14/20  0554 02/13/20  0410   WBC 3.6* 3. 7* 4.4   HGB 10.3* 10.2* 10.4*   HCT 30.8* 30.9* 30.7*   PLT 83* 71* 74*     Recent Labs     02/15/20  0416 02/14/20  0554 02/13/20  0532 02/13/20 0410    136  --  134*   K 4.2 3.5  --  3.3*    96*  --  95*   CO2 30 33*  --  29   * 247*  --  203*   BUN 7 6  --  5*   CREA 0.89 1.05  --  1.27   CA 7.4* 7.2*  --  7.1*   MG 1.8 1.4*  --   --    PHOS 2.9  --   --   --    ALB 2.2* 2.3*  --  2.4*   TBILI 0.9 1.1*  --  1.4*   SGOT 87* 114*  --  192*   ALT 60 74  --  102*   INR  --  1.3* 1.4*  --        Signed: Cruz Dominguez MD

## 2020-02-16 VITALS
WEIGHT: 203.93 LBS | DIASTOLIC BLOOD PRESSURE: 58 MMHG | TEMPERATURE: 98.6 F | BODY MASS INDEX: 32.91 KG/M2 | HEART RATE: 61 BPM | RESPIRATION RATE: 14 BRPM | SYSTOLIC BLOOD PRESSURE: 161 MMHG | OXYGEN SATURATION: 97 %

## 2020-02-16 LAB
GLUCOSE BLD STRIP.AUTO-MCNC: 167 MG/DL (ref 65–100)
SERVICE CMNT-IMP: ABNORMAL

## 2020-02-16 PROCEDURE — 82962 GLUCOSE BLOOD TEST: CPT

## 2020-02-16 PROCEDURE — 74011636637 HC RX REV CODE- 636/637: Performed by: INTERNAL MEDICINE

## 2020-02-16 PROCEDURE — 94760 N-INVAS EAR/PLS OXIMETRY 1: CPT

## 2020-02-16 PROCEDURE — 74011250636 HC RX REV CODE- 250/636: Performed by: INTERNAL MEDICINE

## 2020-02-16 PROCEDURE — 74011250637 HC RX REV CODE- 250/637: Performed by: INTERNAL MEDICINE

## 2020-02-16 RX ORDER — CHLORDIAZEPOXIDE HYDROCHLORIDE 10 MG/1
10 CAPSULE, GELATIN COATED ORAL 3 TIMES DAILY
Qty: 6 CAP | Refills: 0 | Status: SHIPPED | OUTPATIENT
Start: 2020-02-16 | End: 2020-02-18

## 2020-02-16 RX ORDER — NALOXONE HYDROCHLORIDE 4 MG/.1ML
SPRAY NASAL
Qty: 1 EACH | Refills: 1 | Status: SHIPPED | OUTPATIENT
Start: 2020-02-16

## 2020-02-16 RX ORDER — FUROSEMIDE 40 MG/1
40 TABLET ORAL DAILY
Qty: 15 TAB | Refills: 1 | Status: SHIPPED | OUTPATIENT
Start: 2020-02-16 | End: 2020-03-02

## 2020-02-16 RX ORDER — OXYCODONE HYDROCHLORIDE 10 MG/1
5 TABLET ORAL
Qty: 15 TAB | Refills: 0 | Status: SHIPPED | OUTPATIENT
Start: 2020-02-16 | End: 2020-02-21

## 2020-02-16 RX ORDER — BISMUTH SUBSALICYLATE 262 MG
1 TABLET,CHEWABLE ORAL DAILY
Qty: 30 TAB | Refills: 0 | Status: SHIPPED | OUTPATIENT
Start: 2020-02-16 | End: 2020-03-17

## 2020-02-16 RX ORDER — CHLORDIAZEPOXIDE HYDROCHLORIDE 25 MG/1
10 CAPSULE, GELATIN COATED ORAL 3 TIMES DAILY
Qty: 6 CAP | Refills: 0 | Status: SHIPPED | OUTPATIENT
Start: 2020-02-16 | End: 2020-02-16

## 2020-02-16 RX ADMIN — FUROSEMIDE 40 MG: 40 TABLET ORAL at 08:29

## 2020-02-16 RX ADMIN — CHLORDIAZEPOXIDE HYDROCHLORIDE 25 MG: 25 CAPSULE ORAL at 08:29

## 2020-02-16 RX ADMIN — Medication 10 ML: at 04:36

## 2020-02-16 RX ADMIN — LISINOPRIL 5 MG: 5 TABLET ORAL at 08:29

## 2020-02-16 RX ADMIN — MORPHINE SULFATE 2 MG: 2 INJECTION, SOLUTION INTRAMUSCULAR; INTRAVENOUS at 04:36

## 2020-02-16 RX ADMIN — INSULIN LISPRO 2 UNITS: 100 INJECTION, SOLUTION INTRAVENOUS; SUBCUTANEOUS at 08:28

## 2020-02-16 RX ADMIN — FOLIC ACID 1 MG: 1 TABLET ORAL at 08:29

## 2020-02-16 RX ADMIN — Medication 100 MG: at 08:29

## 2020-02-16 RX ADMIN — MORPHINE SULFATE 2 MG: 2 INJECTION, SOLUTION INTRAMUSCULAR; INTRAVENOUS at 09:00

## 2020-02-16 NOTE — PROGRESS NOTES
[address] February 16, 2020       RE: Bridget Perez      To Whom It May Concern,    This is to certify that Bridget Perez may return to work on 2/19/20. He was hospitalized from 2/13/20 to 2/16/20. Please feel free to contact my office if you have any questions or concerns. Thank you for your assistance in this matter. Sincerely,  Marti Pacheco MD  [phone].

## 2020-02-16 NOTE — PROGRESS NOTES
Bedside shift change report given to [facility] 70. (oncoming nurse) by Tayler Church RN (offgoing nurse). Report included the following information SBAR, ED Summary, Procedure Summary, Intake/Output, MAR and Recent Results.

## 2020-02-16 NOTE — PROGRESS NOTES
Problem: Falls - Risk of  Goal: *Absence of Falls  Description  Document Gael Canseco Fall Risk and appropriate interventions in the flowsheet.   Outcome: Progressing Towards Goal  Note: Fall Risk Interventions:  Mobility Interventions: Patient to call before getting OOB    Mentation Interventions: Adequate sleep, hydration, pain control    Medication Interventions: Patient to call before getting OOB, Teach patient to arise slowly    Elimination Interventions: Call light in reach              Problem: Patient Education: Go to Patient Education Activity  Goal: Patient/Family Education  Outcome: Progressing Towards Goal

## 2020-02-27 NOTE — DISCHARGE SUMMARY
Hospitalist Discharge Summary     Patient ID:  Edvin Canales  [id number]  53 y.o.  1975 2/13/2020    PCP on record: None    Admit date: 2/13/2020  Discharge date and time: 2/16/20    DISCHARGE DIAGNOSIS:    Acute alcoholic hepatitis  Steatotic liver  Portal hypertension with hepatomegaly  Chronic alcoholism concern for alcohol withdrawal  PVCs  Hypertension  Diabetes mellitus type 2  Dyslipidemia  Acute thrombocytopenia likely related to alcohol      CONSULTATIONS:  IP CONSULT TO GASTROENTEROLOGY  IP CONSULT TO CARDIOLOGY    Excerpted HPI from H&P of Ravinder Sparks MD:  53years old male with past medical history significant for hypertension, DM, hyperlipidemia was transferred from [address] [place] [address] for evaluation of acute [place], patient was a presented to the hospital for evaluation of abdominal pain and distention associated with bilateral leg swelling associated with nausea and vomiting started about 1 week ago, CT abdomen was done and show acute [place] new hepatomegaly and ascites associated with steatotic liver, portal hypertension, liver enzyme was noticed to be elevated.       ______________________________________________________________________  DISCHARGE SUMMARY/HOSPITAL COURSE:  for full details see H&P, daily progress notes, labs, consult notes. Acute alcoholic hepatitis  Steatotic liver  Portal hypertension with hepatomegaly  -Not meeting criteria for  prednisone  -Counseled regarding alcohol cessation  -GI is following and recommends outpatient follow-up  -Continue Lasix 40 mg daily     Chronic alcoholism concern for alcohol withdrawal  -CIWA score is 6  -Continue multivitamins. he is refusing IV Ativan per protocol  -Start Librium     PVCs  -Electrolytes normal today. Echocardiogram is normal.  -Appreciate cards recommendations        Hypertension  Diabetes mellitus type 2  Dyslipidemia  -Resume home lisinopril  -Holding metformin.   Start insulin sliding scale with blood sugar checks  -Holding statin due to transaminitis     Acute thrombocytopenia likely related to alcohol  -Baseline platelets are around 200 and currently platelet count is around 70  -He was again counseled regarding alcohol cessation    Patient was seen and examined today. He is doing much better. His abdominal pain is improved. He is out of alcohol withdrawal.  His rest of the medical problems remained stable during hospitalization. He was cleared by GI to be discharged for outpatient follow-up in about 1 week's time. He was counseled regarding alcohol cessation. He is being sent home on Lasix per GI recommendations. _______________________________________________________________________  Patient seen and examined by me on discharge day. Pertinent Findings:  Gen:    Not in distress  Chest: Clear lungs  CVS:   Regular rhythm. No edema  Abd:  Soft, not distended, not tender  Neuro:  Alert, awake  _______________________________________________________________________  DISCHARGE MEDICATIONS:   Discharge Medication List as of 2/16/2020 11:19 AM      START taking these medications    Details   furosemide (LASIX) 40 mg tablet Take 1 Tab by mouth daily for 15 days. , Print, Disp-15 Tab, R-1      multivitamin (DAILY MULTI-VITAMIN) tablet Take 1 Tab by mouth daily for 30 days. , Print, Disp-30 Tab, R-0      oxyCODONE IR (ROXICODONE) 10 mg tab immediate release tablet Take 0.5 Tabs by mouth every eight (8) hours as needed for Pain for up to 5 days. Max Daily Amount: 15 mg., Print, Disp-15 Tab, R-0      naloxone (NARCAN) 4 mg/actuation nasal spray Use 1 spray intranasally, then discard. Repeat with new spray every 2 min as needed for opioid overdose symptoms, alternating nostrils. , Print, Disp-1 Each, R-1         CONTINUE these medications which have CHANGED    Details   chlordiazePOXIDE (LIBRIUM) 10 mg capsule Take 1 Cap by mouth three (3) times daily for 2 days.  Max Daily Amount: 30 mg., Print, Disp-6 Cap, R-0 CONTINUE these medications which have NOT CHANGED    Details   lisinopril (PRINIVIL, ZESTRIL) 5 mg tablet Take 1 Tab by mouth daily. , Print, Disp-30 Tab, R-0      metFORMIN (GLUCOPHAGE) 500 mg tablet Take 2 Tabs by mouth two (2) times daily (with meals). , Print, Disp-60 Tab, R-0      ondansetron (ZOFRAN ODT) 4 mg disintegrating tablet Take 1 Tab by mouth every eight (8) hours as needed for Nausea. , Print, Disp-8 Tab, R-0         STOP taking these medications       atorvastatin (LIPITOR) 20 mg tablet Comments:   Reason for Stopping:                 Patient Follow Up Instructions: Activity: Activity as tolerated  Diet: Cardiac Diet  Wound Care: None needed    Follow-up with as below   Follow-up tests/labs     Follow-up Information     Follow up With Specialties Details Why Contact Info    Roselia Sena NP Nurse Practitioner Go on 2/18/2020 New Patient Hospital follow-up scheduled at 8:00am Please bring Insurance Card, Discharge paperwork, Photo ID, list of medications and any co-pay you may have ( If you have questions or need to reschedule please call [phone] [facility]  [phone]      None    None ([phone]) Patient stated that they have no PCP      Please find a PCP in your area asap.   Schedule an appointment as soon as possible for a visit in 1 week      Gabriela Flower MD Hepatology, Liver Disease, Internal Medicine Schedule an appointment as soon as possible for a visit in 1 week  [address]  [phone]          ________________________________________________________________    Risk of deterioration: High    Condition at Discharge:  Stable  __________________________________________________________________    Disposition  Home with family, no needs    ____________________________________________________________________    Code Status: Full Code  ___________________________________________________________________      Total time in minutes spent coordinating this discharge (includes going over instructions, follow-up, prescriptions, and preparing report for sign off to her PCP) :  35  minutes    Signed:  Maria E Mensah MD

## 2020-07-08 ENCOUNTER — PATIENT OUTREACH (OUTPATIENT)
Dept: INTERNAL MEDICINE CLINIC | Age: 45
End: 2020-07-08

## 2020-07-08 NOTE — PROGRESS NOTES
Patient contacted regarding recent discharge and COVID-19 risk. Discussed COVID-19 related testing which was not done at this time. Test results were not done. Patient informed of results, if available? N/A    Care Transition Nurse/ Ambulatory Care Manager contacted the patient by telephone to perform post discharge assessment. Verified name and  with patient as identifiers. Patient has following risk factors of: diabetes and liver disease. CTN/ACM reviewed discharge instructions, medical action plan and red flags related to discharge diagnosis. Reviewed and educated them on any new and changed medications related to discharge diagnosis. Advised obtaining a 90-day supply of all daily and as-needed medications. Patient reports he has not start taking medications as prescribed, plans to  medications today. START taking these medications     Details   diphenoxylate-atropine (LomotiL) 2.5-0.025 mg per tablet Take 1 Tab by mouth four (4) times daily as needed for Diarrhea (1 tab after each stool for max 8 per day). Max Daily Amount: 4 Tabs. Take after each stool for a maximum of 8 tablets daily, Normal, Disp-20 Tab, R-0       metroNIDAZOLE (FlagyL) 500 mg tablet Take 1 Tab by mouth two (2) times a day for 10 days. , Normal, Disp-20 Tab, R-0       HYDROcodone-acetaminophen (Lorcet Plus) 7.5-325 mg per tablet Take 1 Tab by mouth every six (6) hours as needed for Pain for up to 3 days. Max Daily Amount: 4 Tabs., Normal, Disp-12 Tab, R-0       promethazine (PHENERGAN) 25 mg tablet Take 1 Tab by mouth every six (6) hours as needed for Nausea for up to 20 doses. , Normal, Disp-12 Tab, R-0         Education provided regarding infection prevention, and signs and symptoms of COVID-19 and when to seek medical attention with patient who verbalized understanding.  Discussed exposure protocols and quarantine from [address] you at higher risk for severe illness  and given an opportunity for questions and concerns. The patient agrees to contact the COVID-19 hotline [phone] for questions related to their healthcare. CTN/ACM provided contact information for future reference. From CDC: Are you at higher risk for severe illness?  Wash your hands often.  Avoid close contact (6 feet, which is about two arm lengths) with people who are sick.  Put distance between yourself and other people if COVID-19 is spreading in your community.  Clean and disinfect frequently touched surfaces.  Avoid all cruise travel and non-essential air travel.  Call your healthcare professional if you have concerns about COVID-19 and your underlying condition or if you are sick. For more information on steps you can take to protect yourself, see CDC's How to Protect Yourself      Patient/family/caregiver given information for GetWell Loop and agrees to enroll yes  Patient's preferred e-mail:  [email or web address]  Patient's preferred phone number: [phone]   Based on Loop alert triggers, patient will be contacted by nurse care manager for worsening symptoms. Pt will be further monitored by COVID Loop Team based on severity of symptoms and risk factors.

## 2021-05-03 ENCOUNTER — HOSPITAL ENCOUNTER (EMERGENCY)
Age: 46
Discharge: LEFT BEFORE DISCHARGE | End: 2021-05-03
Attending: EMERGENCY MEDICINE | Admitting: EMERGENCY MEDICINE
Payer: COMMERCIAL

## 2021-05-03 ENCOUNTER — APPOINTMENT (OUTPATIENT)
Dept: CT IMAGING | Age: 46
End: 2021-05-03
Attending: EMERGENCY MEDICINE
Payer: COMMERCIAL

## 2021-05-03 VITALS
SYSTOLIC BLOOD PRESSURE: 117 MMHG | HEIGHT: 66 IN | TEMPERATURE: 97.1 F | RESPIRATION RATE: 18 BRPM | OXYGEN SATURATION: 95 % | DIASTOLIC BLOOD PRESSURE: 74 MMHG | BODY MASS INDEX: 28.12 KG/M2 | WEIGHT: 175 LBS | HEART RATE: 108 BPM

## 2021-05-03 DIAGNOSIS — R10.11 RUQ PAIN: Primary | ICD-10-CM

## 2021-05-03 LAB
ALBUMIN SERPL-MCNC: 3.3 G/DL (ref 3.5–5)
ALBUMIN/GLOB SERPL: 0.7 {RATIO} (ref 1.1–2.2)
ALP SERPL-CCNC: 306 U/L (ref 45–117)
ALT SERPL-CCNC: 146 U/L (ref 12–78)
ANION GAP SERPL CALC-SCNC: 9 MMOL/L (ref 5–15)
AST SERPL-CCNC: 317 U/L (ref 15–37)
BASOPHILS # BLD: 0.1 K/UL (ref 0–0.1)
BASOPHILS NFR BLD: 1 % (ref 0–1)
BILIRUB SERPL-MCNC: 1.4 MG/DL (ref 0.2–1)
BUN SERPL-MCNC: 13 MG/DL (ref 6–20)
BUN/CREAT SERPL: 14 (ref 12–20)
CALCIUM SERPL-MCNC: 8.3 MG/DL (ref 8.5–10.1)
CHLORIDE SERPL-SCNC: 102 MMOL/L (ref 97–108)
CO2 SERPL-SCNC: 24 MMOL/L (ref 21–32)
COMMENT, HOLDF: NORMAL
CREAT SERPL-MCNC: 0.93 MG/DL (ref 0.7–1.3)
DIFFERENTIAL METHOD BLD: ABNORMAL
EOSINOPHIL # BLD: 0 K/UL (ref 0–0.4)
EOSINOPHIL NFR BLD: 1 % (ref 0–7)
ERYTHROCYTE [DISTWIDTH] IN BLOOD BY AUTOMATED COUNT: 15.1 % (ref 11.5–14.5)
GLOBULIN SER CALC-MCNC: 4.7 G/DL (ref 2–4)
GLUCOSE SERPL-MCNC: 169 MG/DL (ref 65–100)
HCT VFR BLD AUTO: 37.9 % (ref 36.6–50.3)
HGB BLD-MCNC: 12.9 G/DL (ref 12.1–17)
IMM GRANULOCYTES # BLD AUTO: 0 K/UL (ref 0–0.04)
IMM GRANULOCYTES NFR BLD AUTO: 0 % (ref 0–0.5)
LIPASE SERPL-CCNC: 83 U/L (ref 73–393)
LYMPHOCYTES # BLD: 2 K/UL (ref 0.8–3.5)
LYMPHOCYTES NFR BLD: 47 % (ref 12–49)
MCH RBC QN AUTO: 30.6 PG (ref 26–34)
MCHC RBC AUTO-ENTMCNC: 34 G/DL (ref 30–36.5)
MCV RBC AUTO: 90 FL (ref 80–99)
MONOCYTES # BLD: 0.5 K/UL (ref 0–1)
MONOCYTES NFR BLD: 11 % (ref 5–13)
NEUTS SEG # BLD: 1.7 K/UL (ref 1.8–8)
NEUTS SEG NFR BLD: 40 % (ref 32–75)
NRBC # BLD: 0 K/UL (ref 0–0.01)
NRBC BLD-RTO: 0 PER 100 WBC
PLATELET # BLD AUTO: 363 K/UL (ref 150–400)
PMV BLD AUTO: 9.9 FL (ref 8.9–12.9)
POTASSIUM SERPL-SCNC: 4.1 MMOL/L (ref 3.5–5.1)
PROT SERPL-MCNC: 8 G/DL (ref 6.4–8.2)
RBC # BLD AUTO: 4.21 M/UL (ref 4.1–5.7)
SAMPLES BEING HELD,HOLD: NORMAL
SODIUM SERPL-SCNC: 135 MMOL/L (ref 136–145)
WBC # BLD AUTO: 4.3 K/UL (ref 4.1–11.1)

## 2021-05-03 PROCEDURE — 99284 EMERGENCY DEPT VISIT MOD MDM: CPT

## 2021-05-03 PROCEDURE — 36415 COLL VENOUS BLD VENIPUNCTURE: CPT

## 2021-05-03 PROCEDURE — 85025 COMPLETE CBC W/AUTO DIFF WBC: CPT

## 2021-05-03 PROCEDURE — 80053 COMPREHEN METABOLIC PANEL: CPT

## 2021-05-03 PROCEDURE — 83690 ASSAY OF LIPASE: CPT

## 2021-05-03 RX ORDER — ONDANSETRON 2 MG/ML
4 INJECTION INTRAMUSCULAR; INTRAVENOUS
Status: DISCONTINUED | OUTPATIENT
Start: 2021-05-03 | End: 2021-05-03 | Stop reason: HOSPADM

## 2021-05-03 RX ORDER — KETOROLAC TROMETHAMINE 30 MG/ML
15 INJECTION, SOLUTION INTRAMUSCULAR; INTRAVENOUS
Status: DISCONTINUED | OUTPATIENT
Start: 2021-05-03 | End: 2021-05-03 | Stop reason: HOSPADM

## 2021-05-03 NOTE — ED TRIAGE NOTES
Patient arrives to ED accompanied by wife, reports right sided abdominal pain. Patient keeps starting in triage \"my liver is just killing me\". Reports admitted for pancreatitis last week, recently started on insulin for discharge.     Wife also reports they are here because Harvey Salvador has no place to go and I need help finding him somewhere to live, he can't go live on the streets\"

## 2021-05-04 NOTE — ED NOTES
Unable to find pt on unit or surrounding areas. Called pt via telephone. Pt reports leaving hospital and removing his own IV. Primary RN aware.

## 2021-05-04 NOTE — PROGRESS NOTES
Care Management - Received consult to assist with housing resources for patient who is homeless. CM researched some options. Went to ED to speak with patient and his wife. They were not in room. Spoke with patient's RN. Another RN in nurse's station saw them walking in the [place]. CM looked outside, in bathrooms, at vending machines near cafeteria, and in [address]. Returned to room. Patient still not back. RN called patient's number listed on chart. Patient said he had left the building.      KATHERINE Mcnamara

## 2021-05-04 NOTE — ED PROVIDER NOTES
53-year-old male with a history of alcohol abuse, livers disease, pancreatitis presents with right upper quadrant pain for the past 4 to 5 days. He has had nausea and vomiting associated. States that it feels similar to his prior episodes of pancreatitis. Denies any fevers or chills. He is homeless and does not have anywhere to go. He is accompanied by his wife who is concerned about this. She states that she is living with her mother but he cannot go there. His last drink was today. He states that he has been cutting back. He has had a history of a cholecystectomy.       Abdominal Pain          Past Medical History:   Diagnosis Date    Diabetes ([facility] [address]     Hernia, abdominal     Hypercholesteremia     Lyme disease     Pancreatitis     Restless leg syndrome        Past Surgical History:   Procedure Laterality Date    HX CHOLECYSTECTOMY           Family History:   Problem Relation Age of Onset    Cancer Mother     Diabetes Mother     Hypertension Father        Social History     Socioeconomic History    Marital status:      Spouse name: Not on file    Number of children: Not on file    Years of education: Not on file    Highest education level: Not on file   Occupational History    Not on file   Social Needs    Financial resource strain: Not on file    Food insecurity     Worry: Not on file     Inability: Not on file    Transportation needs     Medical: Not on file     Non-medical: Not on file   Tobacco Use    Smoking status: Former Smoker    Smokeless tobacco: Never Used   Substance and Sexual Activity    Alcohol use: Not Currently     Comment: beer    Drug use: Not Currently    Sexual activity: Not on file   Lifestyle    Physical activity     Days per week: Not on file     Minutes per session: Not on file    Stress: Not on file   Relationships    Social connections     Talks on phone: Not on file     Gets together: Not on file     Attends [employer] service: Not on file Active member of club or organization: Not on file     Attends meetings of clubs or organizations: Not on file     Relationship status: Not on file    Intimate partner violence     Fear of current or ex partner: Not on file     Emotionally abused: Not on file     Physically abused: Not on file     Forced sexual activity: Not on file   Other Topics Concern    Not on file   Social History Narrative    Not on file         ALLERGIES: Patient has no known allergies. Review of Systems   Gastrointestinal: Positive for abdominal pain. All other systems reviewed and are negative. Vitals:    05/03/21 1650   BP: 117/74   Pulse: (!) 108   Resp: 18   Temp: 97.1 °F (36.2 °C)   SpO2: 95%   Weight: 79.4 kg (175 lb)   Height: 5' 6\" (1.676 m)            Physical Exam  Vitals signs and nursing note reviewed. Constitutional:       General: He is not in acute distress. HENT:      Head: Normocephalic and atraumatic. Eyes:      General: No scleral icterus. Conjunctiva/sclera: Conjunctivae normal.      Pupils: Pupils are equal, round, and reactive to light. Neck:      Musculoskeletal: No neck rigidity. Trachea: No tracheal deviation. Cardiovascular:      Rate and Rhythm: Normal rate and regular rhythm. Pulmonary:      Effort: Pulmonary effort is normal. No respiratory distress. Breath sounds: Normal breath sounds. No stridor. Abdominal:      General: There is no distension. Palpations: Abdomen is soft. Tenderness: There is abdominal tenderness in the right upper quadrant. Genitourinary:     Comments: deferred  Musculoskeletal:         General: No deformity. Skin:     General: Skin is warm and dry. Neurological:      General: No focal deficit present. Mental Status: He is alert.    Psychiatric:         Mood and Affect: Mood normal.         Behavior: Behavior normal.          MDM  Number of Diagnoses or Management Options  Diagnosis management comments: 64-year-old male presents with right upper quadrant pain and epigastric pain. Differential diagnosis includes acute pancreatitis, chronic pancreatitis, gastritis, cirrhosis. Lipase returned normal.  Plan was to obtain a CT abdomen pelvis to evaluate for chronic pancreatitis. Treat with Toradol, Zofran, IV fluids. Patient eloped from the emergency department prior to completion of work-up. Procedures      [employer]  Johana Gómez MD

## 2022-03-19 PROBLEM — K75.81 NASH (NONALCOHOLIC STEATOHEPATITIS): Status: ACTIVE | Noted: 2020-02-13

## 2022-07-25 ENCOUNTER — HOSPITAL ENCOUNTER (EMERGENCY)
Age: 47
Discharge: HOME | End: 2022-07-25
Attending: EMERGENCY MEDICINE
Payer: COMMERCIAL

## 2022-07-25 ENCOUNTER — APPOINTMENT (OUTPATIENT)
Dept: GENERAL RADIOLOGY | Age: 47
End: 2022-07-25
Attending: EMERGENCY MEDICINE
Payer: COMMERCIAL

## 2022-07-25 VITALS
HEART RATE: 81 BPM | WEIGHT: 190 LBS | HEIGHT: 66 IN | DIASTOLIC BLOOD PRESSURE: 79 MMHG | SYSTOLIC BLOOD PRESSURE: 141 MMHG | BODY MASS INDEX: 30.53 KG/M2 | RESPIRATION RATE: 18 BRPM | TEMPERATURE: 97.6 F | OXYGEN SATURATION: 98 %

## 2022-07-25 DIAGNOSIS — R60.0 LEG EDEMA, RIGHT: ICD-10-CM

## 2022-07-25 DIAGNOSIS — L03.115 CELLULITIS OF RIGHT LOWER EXTREMITY: Primary | ICD-10-CM

## 2022-07-25 LAB
ALBUMIN SERPL-MCNC: 3.3 G/DL (ref 3.5–5)
ALBUMIN/GLOB SERPL: 0.9 {RATIO} (ref 1.1–2.2)
ALP SERPL-CCNC: 137 U/L (ref 45–117)
ALT SERPL-CCNC: 81 U/L (ref 12–78)
ANION GAP SERPL CALC-SCNC: 8 MMOL/L (ref 5–15)
AST SERPL-CCNC: 47 U/L (ref 15–37)
BASOPHILS # BLD: 0 K/UL (ref 0–0.1)
BASOPHILS NFR BLD: 1 % (ref 0–1)
BILIRUB SERPL-MCNC: 0.5 MG/DL (ref 0.2–1)
BUN SERPL-MCNC: 8 MG/DL (ref 6–20)
BUN/CREAT SERPL: 9 (ref 12–20)
CALCIUM SERPL-MCNC: 9.1 MG/DL (ref 8.5–10.1)
CHLORIDE SERPL-SCNC: 99 MMOL/L (ref 97–108)
CO2 SERPL-SCNC: 29 MMOL/L (ref 21–32)
CREAT SERPL-MCNC: 0.88 MG/DL (ref 0.7–1.3)
D DIMER PPP FEU-MCNC: 1.03 MG/L FEU (ref 0–0.65)
DIFFERENTIAL METHOD BLD: ABNORMAL
EOSINOPHIL # BLD: 0.2 K/UL (ref 0–0.4)
EOSINOPHIL NFR BLD: 4 % (ref 0–7)
ERYTHROCYTE [DISTWIDTH] IN BLOOD BY AUTOMATED COUNT: 12.7 % (ref 11.5–14.5)
GLOBULIN SER CALC-MCNC: 3.7 G/DL (ref 2–4)
GLUCOSE BLD STRIP.AUTO-MCNC: 273 MG/DL (ref 65–117)
GLUCOSE SERPL-MCNC: 248 MG/DL (ref 65–100)
HCT VFR BLD AUTO: 32.9 % (ref 36.6–50.3)
HGB BLD-MCNC: 11.4 G/DL (ref 12.1–17)
IMM GRANULOCYTES # BLD AUTO: 0 K/UL (ref 0–0.04)
IMM GRANULOCYTES NFR BLD AUTO: 0 % (ref 0–0.5)
LACTATE SERPL-SCNC: 1.1 MMOL/L (ref 0.4–2)
LYMPHOCYTES # BLD: 1.3 K/UL (ref 0.8–3.5)
LYMPHOCYTES NFR BLD: 35 % (ref 12–49)
MCH RBC QN AUTO: 30.2 PG (ref 26–34)
MCHC RBC AUTO-ENTMCNC: 34.7 G/DL (ref 30–36.5)
MCV RBC AUTO: 87 FL (ref 80–99)
MONOCYTES # BLD: 0.4 K/UL (ref 0–1)
MONOCYTES NFR BLD: 10 % (ref 5–13)
NEUTS SEG # BLD: 1.8 K/UL (ref 1.8–8)
NEUTS SEG NFR BLD: 50 % (ref 32–75)
NRBC # BLD: 0 K/UL (ref 0–0.01)
NRBC BLD-RTO: 0 PER 100 WBC
PLATELET # BLD AUTO: 171 K/UL (ref 150–400)
PMV BLD AUTO: 9.7 FL (ref 8.9–12.9)
POTASSIUM SERPL-SCNC: 4.1 MMOL/L (ref 3.5–5.1)
PROT SERPL-MCNC: 7 G/DL (ref 6.4–8.2)
RBC # BLD AUTO: 3.78 M/UL (ref 4.1–5.7)
SERVICE CMNT-IMP: ABNORMAL
SODIUM SERPL-SCNC: 136 MMOL/L (ref 136–145)
WBC # BLD AUTO: 3.7 K/UL (ref 4.1–11.1)

## 2022-07-25 PROCEDURE — 36415 COLL VENOUS BLD VENIPUNCTURE: CPT

## 2022-07-25 PROCEDURE — 74011250637 HC RX REV CODE- 250/637: Performed by: EMERGENCY MEDICINE

## 2022-07-25 PROCEDURE — 80053 COMPREHEN METABOLIC PANEL: CPT

## 2022-07-25 PROCEDURE — 99284 EMERGENCY DEPT VISIT MOD MDM: CPT

## 2022-07-25 PROCEDURE — 82962 GLUCOSE BLOOD TEST: CPT

## 2022-07-25 PROCEDURE — 85025 COMPLETE CBC W/AUTO DIFF WBC: CPT

## 2022-07-25 PROCEDURE — 73590 X-RAY EXAM OF LOWER LEG: CPT

## 2022-07-25 PROCEDURE — 87040 BLOOD CULTURE FOR BACTERIA: CPT

## 2022-07-25 PROCEDURE — 74011250636 HC RX REV CODE- 250/636: Performed by: EMERGENCY MEDICINE

## 2022-07-25 PROCEDURE — 83605 ASSAY OF LACTIC ACID: CPT

## 2022-07-25 PROCEDURE — 96372 THER/PROPH/DIAG INJ SC/IM: CPT

## 2022-07-25 PROCEDURE — 85379 FIBRIN DEGRADATION QUANT: CPT

## 2022-07-25 RX ORDER — CEPHALEXIN 500 MG/1
500 CAPSULE ORAL 3 TIMES DAILY
Qty: 21 CAPSULE | Refills: 0 | Status: SHIPPED | OUTPATIENT
Start: 2022-07-25 | End: 2022-08-01

## 2022-07-25 RX ORDER — ENOXAPARIN SODIUM 100 MG/ML
1 INJECTION SUBCUTANEOUS
Status: COMPLETED | OUTPATIENT
Start: 2022-07-25 | End: 2022-07-25

## 2022-07-25 RX ORDER — CEPHALEXIN 250 MG/1
500 CAPSULE ORAL
Status: COMPLETED | OUTPATIENT
Start: 2022-07-25 | End: 2022-07-25

## 2022-07-25 RX ORDER — ACETAMINOPHEN 500 MG
1000 TABLET ORAL
Status: COMPLETED | OUTPATIENT
Start: 2022-07-25 | End: 2022-07-25

## 2022-07-25 RX ADMIN — ACETAMINOPHEN 1000 MG: 500 TABLET ORAL at 16:14

## 2022-07-25 RX ADMIN — CEPHALEXIN 500 MG: 250 CAPSULE ORAL at 16:14

## 2022-07-25 RX ADMIN — ENOXAPARIN SODIUM 90 MG: 100 INJECTION SUBCUTANEOUS at 16:58

## 2022-07-25 NOTE — Clinical Note
[address] [place] [address] EMERGENCY DEP  [address] [place] [id number]  [phone]    Work/School Note    Date: 7/25/2022    To Whom It May concern:    Joseph Bar was seen and treated today in the emergency room by the following provider(s):  No providers found. Joesph Bar is excused from work/school on 07/26/22 and 07/27/22. He is medically clear to return to work/school on 7/28/2022. Sincerely,          Justin Yanez.  MD Kassi

## 2022-07-25 NOTE — ED PROVIDER NOTES
EMERGENCY DEPARTMENT HISTORY AND PHYSICAL EXAM          Date: 7/25/2022  Patient Name: Sonal George. History of Presenting Illness     Chief Complaint   Patient presents with    Leg Problem    Skin Infection       History Provided By: Patient    HPI: Sonal Moran is a 48 y.o. male, pmhx diabetes, who presents ambulatory to the ED c/o right leg pain    Patient explains he tripped over a curb last week and scraped up his leg. He notes he has been washing it every day with Dove soap, hydrogen peroxide and Neosporin. Yesterday after leaving work and standing all day his leg was very swollen and painful. This morning it was not quite as painful but he noticed it was red all over so he decided to come in for evaluation. Currently rates pain at 2/10 and denies any pain, shortness of breath, fevers, chills, nausea, vomiting and diarrhea. PCP: None    Allergies: None known  Social Hx: No tobacco, +vaping, quit EtOH, denies illicit Drug    There are no other complaints, changes, or physical findings at this time. Current Outpatient Medications   Medication Sig Dispense Refill    cephALEXin (Keflex) 500 mg capsule Take 1 Capsule by mouth three (3) times daily for 7 days. 21 Capsule 0    diphenoxylate-atropine (LomotiL) 2.5-0.025 mg per tablet Take 1 Tab by mouth four (4) times daily as needed for Diarrhea (1 tab after each stool for max 8 per day). Max Daily Amount: 4 Tabs. Take after each stool for a maximum of 8 tablets daily 20 Tab 0    promethazine (PHENERGAN) 25 mg tablet Take 1 Tab by mouth every six (6) hours as needed for Nausea for up to 20 doses. 12 Tab 0    naloxone (NARCAN) 4 mg/actuation nasal spray Use 1 spray intranasally, then discard. Repeat with new spray every 2 min as needed for opioid overdose symptoms, alternating nostrils. 1 Each 1    lisinopril (PRINIVIL, ZESTRIL) 5 mg tablet Take 1 Tab by mouth daily.  30 Tab 0    metFORMIN (GLUCOPHAGE) 500 mg tablet Take 2 Tabs by mouth two (2) times daily (with meals). 60 Tab 0    ondansetron (ZOFRAN ODT) 4 mg disintegrating tablet Take 1 Tab by mouth every eight (8) hours as needed for Nausea. 8 Tab 0       Past History     Past Medical History:  Past Medical History:   Diagnosis Date    Diabetes ([address]     Hernia, abdominal     Hypercholesteremia     Lyme disease     Pancreatitis     Restless leg syndrome        Past Surgical History:  Past Surgical History:   Procedure Laterality Date    HX CHOLECYSTECTOMY         Family History:  Family History   Problem Relation Age of Onset    Cancer Mother     Diabetes Mother     Hypertension Father        Social History:  Social History     Tobacco Use    Smoking status: Former    Smokeless tobacco: Never   Substance Use Topics    Alcohol use: Not Currently     Comment: beer    Drug use: Not Currently       Allergies:  No Known Allergies      Review of Systems   Review of Systems   Constitutional:  Negative for activity change, appetite change, chills, fever and unexpected weight change. HENT:  Negative for congestion. Eyes:  Negative for pain and visual disturbance. Respiratory:  Negative for cough and shortness of breath. Cardiovascular:  Positive for leg swelling. Negative for chest pain. Gastrointestinal:  Negative for abdominal pain, diarrhea, nausea and vomiting. Genitourinary:  Negative for dysuria. Musculoskeletal:  Negative for back pain. Skin:  Negative for rash. Neurological:  Negative for headaches. Physical Exam   Physical Exam  Vitals and nursing note reviewed. Constitutional:       Appearance: He is well-developed. He is not diaphoretic. Comments: Overweight middle-age male who appears uncomfortable, with slightly elevated blood pressure, otherwise in minimal acute distress   HENT:      Head: Normocephalic and atraumatic. Eyes:      General:         Right eye: No discharge. Left eye: No discharge.       Conjunctiva/sclera: Conjunctivae normal.      Pupils: Pupils are equal, round, and reactive to light. Cardiovascular:      Rate and Rhythm: Normal rate and regular rhythm. Heart sounds: Normal heart sounds. No murmur heard. Pulmonary:      Effort: Pulmonary effort is normal. No respiratory distress. Breath sounds: Normal breath sounds. No wheezing or rales. Musculoskeletal:         General: Normal range of motion. Cervical back: Normal range of motion and neck supple. Comments: The right leg from knee to ankle appears asymmetrically more edematous when compared to the left leg but there is no calf tenderness. There is no tenderness of the thigh or groin and femoral pulses normal.   Skin:     General: Skin is warm and dry. Findings: No rash. Comments: Large vertical abrasion approximately 8 cm along the anterior shin with overlying eschar. There is surrounding erythema and warmth and tenderness of the skin. Neurological:      Mental Status: He is alert and oriented to person, place, and time. Cranial Nerves: No cranial nerve deficit. Motor: No abnormal muscle tone. Diagnostic Study Results     Labs -   No results found for this or any previous visit (from the past 12 hour(s)). Radiologic Studies -   XR TIB/FIB RT   Final Result   No acute abnormality. DUPLEX LOWER EXT VENOUS RIGHT    (Results Pending)     CT Results  (Last 48 hours)      None          CXR Results  (Last 48 hours)      None              Medical Decision Making   I am the first provider for this patient. I reviewed the vital signs, available nursing notes, past medical history, past surgical history, family history and social history. Vital Signs-Reviewed the patient's vital signs.   Patient Vitals for the past 24 hrs:   Temp Pulse Resp BP SpO2   07/25/22 1615 -- 81 18 (!) 141/79 98 %   07/25/22 1526 97.6 °F (36.4 °C) 91 18 (!) 159/79 97 %   07/25/22 1521 -- -- -- -- 97 %       Pulse Oximetry Analysis - 98% on RA    Records Reviewed: Nursing Notes    Provider Notes (Medical Decision Making):   MDM: Middle-age male, afebrile with right leg wound and surrounding erythema. Of note those the entire leg is edematous from the knee without any evidence of thigh edema. Differential includes posttraumatic DVT as well as cellulitis and trauma. X-rays are normal, labs without evidence of infection, unfortunately is after 4:00 in the ultrasound tech has left for the day. D-dimer elevated so we will provide him 1 dose of Lovenox here in addition to the antibiotic and arrange for him to have outpatient ultrasound in the morning. ED Course:   Initial assessment performed. The patients presenting problems have been discussed, and they are in agreement with the care plan formulated and outlined with them. I have encouraged them to ask questions as they arise throughout their visit. PROGRESS NOTE:  4:15 PM  Pt appears comfortable with family at bedside. We talked about the possibility of infection versus DVT. Antibiotics already provided here in the emergency room and a prescription has been sent to his pharmacy. Patient and family appear comfortable with plan. Discharge note:  Pt re-evaluated and noted to be feeling better, ready for discharge. Updated pt on all final lab findings. Will follow up as instructed. All questions have been answered, pt voiced understanding and agreement with plan. Abx were prescribed, pt advised that diarrhea and rash are possible side effects of the medications. Specific return precautions provided as well as instructions to return to the ED should sx worsen at any time. Vital signs stable for discharge. Critical Care Time:   0      Diagnosis     Clinical Impression:   1. Cellulitis of right lower extremity    2. Leg edema, right        PLAN:  1.    Discharge Medication List as of 7/25/2022  4:46 PM        START taking these medications    Details   cephALEXin (Keflex) 500 mg capsule Take 1 Capsule by mouth three (3) times daily for 7 days. , Normal, Disp-21 Capsule, R-0           CONTINUE these medications which have NOT CHANGED    Details   diphenoxylate-atropine (LomotiL) 2.5-0.025 mg per tablet Take 1 Tab by mouth four (4) times daily as needed for Diarrhea (1 tab after each stool for max 8 per day). Max Daily Amount: 4 Tabs. Take after each stool for a maximum of 8 tablets daily, Normal, Disp-20 Tab, R-0      promethazine (PHENERGAN) 25 mg tablet Take 1 Tab by mouth every six (6) hours as needed for Nausea for up to 20 doses. , Normal, Disp-12 Tab, R-0      naloxone (NARCAN) 4 mg/actuation nasal spray Use 1 spray intranasally, then discard. Repeat with new spray every 2 min as needed for opioid overdose symptoms, alternating nostrils. , Print, Disp-1 Each, R-1      lisinopril (PRINIVIL, ZESTRIL) 5 mg tablet Take 1 Tab by mouth daily. , Print, Disp-30 Tab, R-0      metFORMIN (GLUCOPHAGE) 500 mg tablet Take 2 Tabs by mouth two (2) times daily (with meals). , Print, Disp-60 Tab, R-0      ondansetron (ZOFRAN ODT) 4 mg disintegrating tablet Take 1 Tab by mouth every eight (8) hours as needed for Nausea. , Print, Disp-8 Tab, R-0           2. Follow-up Information       Follow up With Specialties Details Why Contact Info    [address] [place] [address] Emergency Medicine  If symptoms worsen [address] [id number]          Return to ED if worse     Disposition:  Home       Please note, this dictation was completed with [employer] Index, the [employer] voice recognition software. Quite often unanticipated grammatical, syntax, homophones, and other interpretive errors are inadvertently transcribed by the computer software. Please disregard these errors. Please excuse any errors that have escaped final proof reading.

## 2022-07-25 NOTE — DISCHARGE INSTRUCTIONS
Seen in the ER for leg pain and swelling. Your lab tests are normal except for the test for blood clots. Your x-ray does not show any injury. Please return tomorrow first thing in the morning as discussed for ultrasound of your leg so that we can rule out a blood clot.

## 2022-07-25 NOTE — ED TRIAGE NOTES
Pt presents via ambulatory for redness and swelling to the right lower leg x 1 week. Pt hit his leg on a cement curb and has now developed redness extending to the right foot. Pt is a diabetic. Denies any fever.

## 2022-07-26 ENCOUNTER — HOSPITAL ENCOUNTER (OUTPATIENT)
Dept: ULTRASOUND IMAGING | Age: 47
Discharge: HOME | End: 2022-07-26
Attending: EMERGENCY MEDICINE
Payer: COMMERCIAL

## 2022-07-26 PROCEDURE — 93971 EXTREMITY STUDY: CPT

## 2022-07-26 RX ORDER — IBUPROFEN 600 MG/1
600 TABLET ORAL
Qty: 20 TABLET | Refills: 0 | Status: SHIPPED | OUTPATIENT
Start: 2022-07-26

## 2022-07-31 LAB
BACTERIA SPEC CULT: NORMAL
SERVICE CMNT-IMP: NORMAL

## 2022-08-09 ENCOUNTER — HOSPITAL ENCOUNTER (EMERGENCY)
Age: 47
Discharge: HOME | End: 2022-08-09
Attending: EMERGENCY MEDICINE
Payer: COMMERCIAL

## 2022-08-09 VITALS
HEIGHT: 66 IN | TEMPERATURE: 98.2 F | DIASTOLIC BLOOD PRESSURE: 89 MMHG | OXYGEN SATURATION: 97 % | RESPIRATION RATE: 18 BRPM | BODY MASS INDEX: 30.53 KG/M2 | SYSTOLIC BLOOD PRESSURE: 138 MMHG | HEART RATE: 89 BPM | WEIGHT: 190 LBS

## 2022-08-09 DIAGNOSIS — B35.6 TINEA CRURIS: Primary | ICD-10-CM

## 2022-08-09 PROCEDURE — 99282 EMERGENCY DEPT VISIT SF MDM: CPT

## 2022-08-09 RX ORDER — NYSTATIN 100000 [USP'U]/G
POWDER TOPICAL 3 TIMES DAILY
Qty: 60 G | Refills: 0 | Status: SHIPPED | OUTPATIENT
Start: 2022-08-09

## 2022-08-09 RX ORDER — CHLORPHENIRAMINE MALEATE 4 MG
TABLET ORAL 2 TIMES DAILY
Qty: 60 G | Refills: 0 | Status: SHIPPED | OUTPATIENT
Start: 2022-08-09 | End: 2022-09-08

## 2022-08-09 NOTE — ED TRIAGE NOTES
Pt arrives with c/o an itching, painful rash in his groin area. He states that he has tried neosporin and tucks pads with no relief.

## 2022-08-09 NOTE — ED PROVIDER NOTES
EMERGENCY DEPARTMENT HISTORY AND PHYSICAL EXAM      Date: 8/9/2022  Patient Name: Adonay Burns. Diagnosis     Clinical Impression:   1. Tinea cruris                    History of Presenting Illness     Chief Complaint   Patient presents with    Rash       History Provided By: Patient    HPI:   The history is provided by the patient. Rash   This is a new problem. The current episode started more than 1 week ago. The problem has not changed since onset. The problem is associated with medication. There has been no fever. The rash is present on the groin and genitalia. The patient is experiencing no pain. Associated symptoms include itching and weeping. Pertinent negatives include no hives. Risk factors include new medication. He has tried anti-itch cream and a cold compress for the symptoms. The treatment provided no relief. Adonay Burns., 48 y.o. male  presents to the ED with cc of rash to the groin and genital area perianal buttock area that has been there for approximately 2 weeks, he was recently seen here for an abrasion to the leg that was infected and placed on cephalexin. Reports the rash started shortly thereafter, he does have a history of diabetes. He reports itching and cracking of the skin he is tried over-the-counter meds without relief. Denies any fevers chills, area involved includes the groin genitalia and buttock area. He denies fevers chills. There are no other complaints, changes, or physical findings at this time. PCP: None    No current facility-administered medications on file prior to encounter. Current Outpatient Medications on File Prior to Encounter   Medication Sig Dispense Refill    ibuprofen (MOTRIN) 600 mg tablet Take 1 Tablet by mouth every six (6) hours as needed for Pain. 20 Tablet 0    diphenoxylate-atropine (LomotiL) 2.5-0.025 mg per tablet Take 1 Tab by mouth four (4) times daily as needed for Diarrhea (1 tab after each stool for max 8 per day). Max Daily Amount: 4 Tabs. Take after each stool for a maximum of 8 tablets daily 20 Tab 0    promethazine (PHENERGAN) 25 mg tablet Take 1 Tab by mouth every six (6) hours as needed for Nausea for up to 20 doses. 12 Tab 0    naloxone (NARCAN) 4 mg/actuation nasal spray Use 1 spray intranasally, then discard. Repeat with new spray every 2 min as needed for opioid overdose symptoms, alternating nostrils. 1 Each 1    lisinopril (PRINIVIL, ZESTRIL) 5 mg tablet Take 1 Tab by mouth daily. 30 Tab 0    metFORMIN (GLUCOPHAGE) 500 mg tablet Take 2 Tabs by mouth two (2) times daily (with meals). 60 Tab 0    ondansetron (ZOFRAN ODT) 4 mg disintegrating tablet Take 1 Tab by mouth every eight (8) hours as needed for Nausea. 8 Tab 0       Past History     Past Medical History:  Past Medical History:   Diagnosis Date    Diabetes ([address]     Hernia, abdominal     Hypercholesteremia     Lyme disease     Pancreatitis     Restless leg syndrome        Past Surgical History:  Past Surgical History:   Procedure Laterality Date    HX CHOLECYSTECTOMY         Family History:  Family History   Problem Relation Age of Onset    Cancer Mother     Diabetes Mother     Hypertension Father        Social History:  Social History     Tobacco Use    Smoking status: Former    Smokeless tobacco: Never   Substance Use Topics    Alcohol use: Not Currently     Comment: beer    Drug use: Not Currently       Allergies:  No Known Allergies      Review of Systems   Review of Systems   Constitutional: Negative. Negative for chills and fever. HENT: Negative. Negative for congestion and sore throat. Eyes: Negative. Negative for discharge and redness. Gastrointestinal: Negative. Negative for abdominal pain, nausea and vomiting. Genitourinary: Negative. Negative for dysuria, flank pain and frequency. Musculoskeletal: Negative. Negative for arthralgias and back pain. Skin:  Positive for itching and rash. Negative for wound.    Allergic/Immunologic: Negative. Neurological: Negative. Negative for dizziness and headaches. Hematological: Negative. Negative for adenopathy. Does not bruise/bleed easily. Psychiatric/Behavioral: Negative. Negative for confusion. The patient is not nervous/anxious. All other systems reviewed and are negative. Physical Exam   Physical Exam  Vitals and nursing note reviewed. Constitutional:       General: He is not in acute distress. Appearance: Normal appearance. He is well-developed. He is not ill-appearing, toxic-appearing or diaphoretic. HENT:      Head: Normocephalic and atraumatic. Nose: Nose normal.      Mouth/Throat:      Mouth: Mucous membranes are moist.      Pharynx: Oropharynx is clear. Eyes:      Extraocular Movements: Extraocular movements intact. Conjunctiva/sclera: Conjunctivae normal.      Pupils: Pupils are equal, round, and reactive to light. Cardiovascular:      Rate and Rhythm: Normal rate and regular rhythm. Pulses: Normal pulses. Pulmonary:      Effort: Pulmonary effort is normal. No respiratory distress. Abdominal:      General: There is no distension. Palpations: Abdomen is soft. Musculoskeletal:         General: No swelling or tenderness. Normal range of motion. Cervical back: Normal range of motion and neck supple. No rigidity. No muscular tenderness. Skin:     General: Skin is warm and dry. Capillary Refill: Capillary refill takes less than 2 seconds. Findings: Erythema and rash present. No abscess, bruising, ecchymosis, petechiae or wound. Rash is not crusting, papular, pustular or vesicular. Neurological:      General: No focal deficit present. Mental Status: He is alert and oriented to person, place, and time. Mental status is at baseline. Cranial Nerves: No cranial nerve deficit. Sensory: No sensory deficit.    Psychiatric:         Mood and Affect: Mood normal.         Behavior: Behavior normal.         Thought Content: Thought content normal.         Judgment: Judgment normal.       Diagnostic Study Results     Labs -   No results found for this or any previous visit (from the past 12 hour(s)). Radiologic Studies -   No orders to display     CT Results  (Last 48 hours)      None          CXR Results  (Last 48 hours)      None            Medical Decision Making   I am the first provider for this patient. I reviewed the vital signs, available nursing notes, past medical history, past surgical history, family history and social history. Vital Signs-Reviewed the patient's vital signs. Patient Vitals for the past 12 hrs:   Temp Pulse Resp BP SpO2   08/09/22 1915 -- -- -- -- 97 %   08/09/22 1905 98.2 °F (36.8 °C) 89 18 138/89 97 %   08/09/22 1855 98.2 °F (36.8 °C) -- 18 -- 97 %             Records Reviewed: Nursing Notes and Old Medical Records    Provider Notes (Medical Decision Making):   Presents with a itchy groin rash which is consistent with candidiasis we will plan antifungal topical creams. ED Course:   Initial assessment performed. The patients presenting problems have been discussed, and they are in agreement with the care plan formulated and outlined with them. I have encouraged them to ask questions as they arise throughout their visit. Medications Given in the ED:    Medications - No data to display        7:17 PM    Patient presents with a candidal rash, will give him topical nystatin powder as well as clotrimazole cream to apply to treat the rash, he is instructed see his PCP for follow-up return here for change or worsening of symptoms    Pt has been re-examined and states that they are feeling better and have no new complaints. medications,diagnosis, follow up plan and return instructions have been reviewed and discussed with the patient and/or family. Pt and/or family were instructed on symptoms that may arise after discharge requiring re-evaluation by a physician.  Pt and/or family have had the opportunity to ask questions about their care. Patient and/or family verbalized understanding and agreement with care plan, follow up and return instructions. Patient and/or family agree to return in 50 hours if their symptoms are not improving or immediately if they have any change in their condition. I have also put together some discharge instructions for patient that include: 1) educational information regarding their diagnosis, 2) how to care for their diagnosis at home, as well a 3) list of reasons why they would want to return to the ED prior to their follow-up appointment, should their condition change. Paige Ramos MD      Procedures          Disposition:  Discharged          DISCHARGE PLAN:  1. Current Discharge Medication List        START taking these medications    Details   nystatin (MYCOSTATIN) powder Apply  to affected area three (3) times daily. Qty: 60 g, Refills: 0  Start date: 8/9/2022      clotrimazole (LOTRIMIN) 1 % topical cream Apply  to affected area two (2) times a day for 30 days. Apply twice a day for 2 weeks or for 2 days after  rash resolves  Qty: 60 g, Refills: 0  Start date: 8/9/2022, End date: 9/8/2022           2. Follow-up Information       Follow up With Specialties Details Why Contact Info    [facility] Emergency Medicine Go to  If symptoms worsen [address]  [address] [facility] [address]  [phone]          3. Return to ED if worse       Attestations: Paige Ramos MD    Please note that this dictation was completed with [employer], the computer voice recognition software. Quite often unanticipated grammatical, syntax, homophones, and other interpretive errors are inadvertently transcribed by the computer software. Please disregard these errors. Please excuse any errors that have escaped final proofreading. Thank you.

## 2022-08-12 ENCOUNTER — APPOINTMENT (OUTPATIENT)
Dept: CT IMAGING | Age: 47
End: 2022-08-12
Attending: EMERGENCY MEDICINE
Payer: COMMERCIAL

## 2022-08-12 ENCOUNTER — HOSPITAL ENCOUNTER (EMERGENCY)
Age: 47
Discharge: ANOTHER HOSPITAL | End: 2022-08-13
Attending: EMERGENCY MEDICINE
Payer: COMMERCIAL

## 2022-08-12 DIAGNOSIS — K85.90 ACUTE PANCREATITIS, UNSPECIFIED COMPLICATION STATUS, UNSPECIFIED PANCREATITIS TYPE: ICD-10-CM

## 2022-08-12 DIAGNOSIS — E10.10 TYPE 1 DIABETES MELLITUS WITH KETOACIDOSIS WITHOUT COMA (HCC): Primary | ICD-10-CM

## 2022-08-12 LAB
ADMINISTERED INITIALS, ADMINIT: NORMAL
ALBUMIN SERPL-MCNC: 4.6 G/DL (ref 3.5–5)
ALBUMIN/GLOB SERPL: 1.1 {RATIO} (ref 1.1–2.2)
ALP SERPL-CCNC: 127 U/L (ref 45–117)
ALT SERPL-CCNC: 62 U/L (ref 12–78)
ANION GAP SERPL CALC-SCNC: 23 MMOL/L (ref 5–15)
APPEARANCE UR: CLEAR
ARTERIAL PATENCY WRIST A: YES
AST SERPL-CCNC: 39 U/L (ref 15–37)
BACTERIA URNS QL MICRO: NEGATIVE /HPF
BASE DEFICIT BLDA-SCNC: 4.8 MMOL/L
BASOPHILS # BLD: 0 K/UL (ref 0–0.1)
BASOPHILS NFR BLD: 0 % (ref 0–1)
BDY SITE: ABNORMAL
BILIRUB SERPL-MCNC: 0.9 MG/DL (ref 0.2–1)
BILIRUB UR QL: NEGATIVE
BUN SERPL-MCNC: 18 MG/DL (ref 6–20)
BUN/CREAT SERPL: 16 (ref 12–20)
CALCIUM SERPL-MCNC: 8.5 MG/DL (ref 8.5–10.1)
CHLORIDE SERPL-SCNC: 90 MMOL/L (ref 97–108)
CO2 SERPL-SCNC: 17 MMOL/L (ref 21–32)
COLOR UR: ABNORMAL
CREAT SERPL-MCNC: 1.13 MG/DL (ref 0.7–1.3)
D50 ADMINISTERED, D50ADM: 0 ML
D50 ADMINISTERED, D50ADM: 15 ML
D50 ORDER, D50ORD: 0 ML
D50 ORDER, D50ORD: 15 ML
DIFFERENTIAL METHOD BLD: ABNORMAL
EOSINOPHIL # BLD: 0 K/UL (ref 0–0.4)
EOSINOPHIL NFR BLD: 0 % (ref 0–7)
EPITH CASTS URNS QL MICRO: NORMAL /LPF
ERYTHROCYTE [DISTWIDTH] IN BLOOD BY AUTOMATED COUNT: 13.2 % (ref 11.5–14.5)
FIO2 ON VENT: 21 %
GLOBULIN SER CALC-MCNC: 4.2 G/DL (ref 2–4)
GLUCOSE BLD STRIP.AUTO-MCNC: 137 MG/DL (ref 65–117)
GLUCOSE BLD STRIP.AUTO-MCNC: 177 MG/DL (ref 65–117)
GLUCOSE BLD STRIP.AUTO-MCNC: 207 MG/DL (ref 65–117)
GLUCOSE BLD STRIP.AUTO-MCNC: 258 MG/DL (ref 65–117)
GLUCOSE BLD STRIP.AUTO-MCNC: 315 MG/DL (ref 65–117)
GLUCOSE BLD STRIP.AUTO-MCNC: 53 MG/DL (ref 65–117)
GLUCOSE BLD STRIP.AUTO-MCNC: 57 MG/DL (ref 65–117)
GLUCOSE BLD STRIP.AUTO-MCNC: 62 MG/DL (ref 65–117)
GLUCOSE BLD STRIP.AUTO-MCNC: 75 MG/DL (ref 65–117)
GLUCOSE SERPL-MCNC: 424 MG/DL (ref 65–100)
GLUCOSE UR STRIP.AUTO-MCNC: >1000 MG/DL
GLUCOSE, GLC: 137 MG/DL
GLUCOSE, GLC: 177 MG/DL
GLUCOSE, GLC: 315 MG/DL
GLUCOSE, GLC: 62 MG/DL
GLUCOSE, GLC: 75 MG/DL
HCO3 BLDA-SCNC: 18 MMOL/L (ref 22–26)
HCT VFR BLD AUTO: 44.6 % (ref 36.6–50.3)
HGB BLD-MCNC: 16.3 G/DL (ref 12.1–17)
HGB UR QL STRIP: ABNORMAL
HIGH TARGET, HITG: 250 MG/DL
IMM GRANULOCYTES # BLD AUTO: 0.2 K/UL (ref 0–0.04)
IMM GRANULOCYTES NFR BLD AUTO: 1 % (ref 0–0.5)
INSULIN ADMINSTERED, INSADM: 0 UNITS/HOUR
INSULIN ADMINSTERED, INSADM: 0.5 UNITS/HOUR
INSULIN ADMINSTERED, INSADM: 8.1 UNITS/HOUR
INSULIN ORDER, INSORD: 0 UNITS/HOUR
INSULIN ORDER, INSORD: 0.5 UNITS/HOUR
INSULIN ORDER, INSORD: 5.1 UNITS/HOUR
KETONES UR QL STRIP.AUTO: NEGATIVE MG/DL
LEUKOCYTE ESTERASE UR QL STRIP.AUTO: NEGATIVE
LIPASE SERPL-CCNC: >3000 U/L (ref 73–393)
LOW TARGET, LOT: 150 MG/DL
LYMPHOCYTES # BLD: 1.6 K/UL (ref 0.8–3.5)
LYMPHOCYTES NFR BLD: 7 % (ref 12–49)
MCH RBC QN AUTO: 30.4 PG (ref 26–34)
MCHC RBC AUTO-ENTMCNC: 36.5 G/DL (ref 30–36.5)
MCV RBC AUTO: 83.1 FL (ref 80–99)
MINUTES UNTIL NEXT BG, NBG: 15 MIN
MINUTES UNTIL NEXT BG, NBG: 60 MIN
MONOCYTES # BLD: 0.9 K/UL (ref 0–1)
MONOCYTES NFR BLD: 4 % (ref 5–13)
MULTIPLIER, MUL: 0
MULTIPLIER, MUL: 0.01
MULTIPLIER, MUL: 0.02
NEUTS SEG # BLD: 20.1 K/UL (ref 1.8–8)
NEUTS SEG NFR BLD: 88 % (ref 32–75)
NITRITE UR QL STRIP.AUTO: NEGATIVE
NRBC # BLD: 0 K/UL (ref 0–0.01)
NRBC BLD-RTO: 0 PER 100 WBC
ORDER INITIALS, ORDINIT: NORMAL
PCO2 BLDA: 26 MMHG (ref 35–45)
PH BLDA: 7.44 [PH] (ref 7.35–7.45)
PH UR STRIP: 5.5 [PH] (ref 5–8)
PLATELET # BLD AUTO: 488 K/UL (ref 150–400)
PMV BLD AUTO: 9.6 FL (ref 8.9–12.9)
PO2 BLDA: 95 MMHG (ref 80–100)
POTASSIUM SERPL-SCNC: 4.6 MMOL/L (ref 3.5–5.1)
PROT SERPL-MCNC: 8.8 G/DL (ref 6.4–8.2)
PROT UR STRIP-MCNC: ABNORMAL MG/DL
RBC # BLD AUTO: 5.37 M/UL (ref 4.1–5.7)
RBC #/AREA URNS HPF: NORMAL /HPF (ref 0–5)
RBC MORPH BLD: ABNORMAL
SAO2 % BLD: 98 % (ref 92–97)
SAO2% DEVICE SAO2% SENSOR NAME: ABNORMAL
SERVICE CMNT-IMP: ABNORMAL
SERVICE CMNT-IMP: NORMAL
SODIUM SERPL-SCNC: 130 MMOL/L (ref 136–145)
SP GR UR REFRACTOMETRY: 1.02 (ref 1–1.03)
SPECIMEN SITE: ABNORMAL
UROBILINOGEN UR QL STRIP.AUTO: 0.2 EU/DL (ref 0.2–1)
WBC # BLD AUTO: 22.8 K/UL (ref 4.1–11.1)
WBC URNS QL MICRO: NORMAL /HPF (ref 0–4)

## 2022-08-12 PROCEDURE — 85025 COMPLETE CBC W/AUTO DIFF WBC: CPT

## 2022-08-12 PROCEDURE — 83690 ASSAY OF LIPASE: CPT

## 2022-08-12 PROCEDURE — 74011250636 HC RX REV CODE- 250/636: Performed by: EMERGENCY MEDICINE

## 2022-08-12 PROCEDURE — 82962 GLUCOSE BLOOD TEST: CPT

## 2022-08-12 PROCEDURE — 96375 TX/PRO/DX INJ NEW DRUG ADDON: CPT

## 2022-08-12 PROCEDURE — 96374 THER/PROPH/DIAG INJ IV PUSH: CPT

## 2022-08-12 PROCEDURE — 80053 COMPREHEN METABOLIC PANEL: CPT

## 2022-08-12 PROCEDURE — 81001 URINALYSIS AUTO W/SCOPE: CPT

## 2022-08-12 PROCEDURE — 36415 COLL VENOUS BLD VENIPUNCTURE: CPT

## 2022-08-12 PROCEDURE — 96367 TX/PROPH/DG ADDL SEQ IV INF: CPT

## 2022-08-12 PROCEDURE — 74011636637 HC RX REV CODE- 636/637: Performed by: EMERGENCY MEDICINE

## 2022-08-12 PROCEDURE — 74011000636 HC RX REV CODE- 636: Performed by: EMERGENCY MEDICINE

## 2022-08-12 PROCEDURE — 99285 EMERGENCY DEPT VISIT HI MDM: CPT

## 2022-08-12 PROCEDURE — 74011000258 HC RX REV CODE- 258: Performed by: EMERGENCY MEDICINE

## 2022-08-12 PROCEDURE — 36600 WITHDRAWAL OF ARTERIAL BLOOD: CPT

## 2022-08-12 PROCEDURE — 74177 CT ABD & PELVIS W/CONTRAST: CPT

## 2022-08-12 PROCEDURE — 96361 HYDRATE IV INFUSION ADD-ON: CPT

## 2022-08-12 PROCEDURE — 96365 THER/PROPH/DIAG IV INF INIT: CPT

## 2022-08-12 PROCEDURE — 96366 THER/PROPH/DIAG IV INF ADDON: CPT

## 2022-08-12 PROCEDURE — 82803 BLOOD GASES ANY COMBINATION: CPT

## 2022-08-12 RX ORDER — DEXTROSE, SODIUM CHLORIDE, AND POTASSIUM CHLORIDE 5; .9; .15 G/100ML; G/100ML; G/100ML
100 INJECTION INTRAVENOUS CONTINUOUS
Status: DISCONTINUED | OUTPATIENT
Start: 2022-08-12 | End: 2022-08-12

## 2022-08-12 RX ORDER — ONDANSETRON 2 MG/ML
4 INJECTION INTRAMUSCULAR; INTRAVENOUS
Status: COMPLETED | OUTPATIENT
Start: 2022-08-12 | End: 2022-08-12

## 2022-08-12 RX ORDER — HYDROMORPHONE HYDROCHLORIDE 1 MG/ML
0.5 INJECTION, SOLUTION INTRAMUSCULAR; INTRAVENOUS; SUBCUTANEOUS
Status: COMPLETED | OUTPATIENT
Start: 2022-08-12 | End: 2022-08-12

## 2022-08-12 RX ORDER — MORPHINE SULFATE 4 MG/ML
4 INJECTION INTRAVENOUS
Status: COMPLETED | OUTPATIENT
Start: 2022-08-12 | End: 2022-08-12

## 2022-08-12 RX ORDER — PROCHLORPERAZINE EDISYLATE 5 MG/ML
10 INJECTION INTRAMUSCULAR; INTRAVENOUS ONCE
Status: COMPLETED | OUTPATIENT
Start: 2022-08-12 | End: 2022-08-12

## 2022-08-12 RX ORDER — DEXTROSE, SODIUM CHLORIDE, AND POTASSIUM CHLORIDE 5; .45; .15 G/100ML; G/100ML; G/100ML
100 INJECTION INTRAVENOUS CONTINUOUS
Status: DISCONTINUED | OUTPATIENT
Start: 2022-08-12 | End: 2022-08-13 | Stop reason: HOSPADM

## 2022-08-12 RX ORDER — DIPHENHYDRAMINE HYDROCHLORIDE 50 MG/ML
25 INJECTION, SOLUTION INTRAMUSCULAR; INTRAVENOUS
Status: COMPLETED | OUTPATIENT
Start: 2022-08-12 | End: 2022-08-12

## 2022-08-12 RX ADMIN — HYDROMORPHONE HYDROCHLORIDE 0.5 MG: 1 INJECTION, SOLUTION INTRAMUSCULAR; INTRAVENOUS; SUBCUTANEOUS at 19:04

## 2022-08-12 RX ADMIN — IOPAMIDOL 100 ML: 612 INJECTION, SOLUTION INTRAVENOUS at 15:53

## 2022-08-12 RX ADMIN — SODIUM CHLORIDE 1000 ML: 9 INJECTION, SOLUTION INTRAVENOUS at 15:40

## 2022-08-12 RX ADMIN — Medication 100 UNITS: at 23:59

## 2022-08-12 RX ADMIN — HYDROMORPHONE HYDROCHLORIDE 0.5 MG: 1 INJECTION, SOLUTION INTRAMUSCULAR; INTRAVENOUS; SUBCUTANEOUS at 23:00

## 2022-08-12 RX ADMIN — HYDROMORPHONE HYDROCHLORIDE 0.5 MG: 1 INJECTION, SOLUTION INTRAMUSCULAR; INTRAVENOUS; SUBCUTANEOUS at 17:00

## 2022-08-12 RX ADMIN — Medication 0.1 UNITS/KG/HR: at 16:10

## 2022-08-12 RX ADMIN — PROCHLORPERAZINE EDISYLATE 10 MG: 5 INJECTION INTRAMUSCULAR; INTRAVENOUS at 15:44

## 2022-08-12 RX ADMIN — MORPHINE SULFATE 4 MG: 4 INJECTION INTRAVENOUS at 14:54

## 2022-08-12 RX ADMIN — ONDANSETRON 4 MG: 2 INJECTION INTRAMUSCULAR; INTRAVENOUS at 19:02

## 2022-08-12 RX ADMIN — POTASSIUM CHLORIDE, DEXTROSE MONOHYDRATE AND SODIUM CHLORIDE 100 ML/HR: 150; 5; 450 INJECTION, SOLUTION INTRAVENOUS at 18:33

## 2022-08-12 RX ADMIN — ONDANSETRON 4 MG: 2 INJECTION INTRAMUSCULAR; INTRAVENOUS at 22:59

## 2022-08-12 RX ADMIN — SODIUM CHLORIDE 1000 ML: 9 INJECTION, SOLUTION INTRAVENOUS at 14:55

## 2022-08-12 RX ADMIN — ONDANSETRON 4 MG: 2 INJECTION INTRAMUSCULAR; INTRAVENOUS at 14:53

## 2022-08-12 RX ADMIN — DIPHENHYDRAMINE HYDROCHLORIDE 25 MG: 50 INJECTION, SOLUTION INTRAMUSCULAR; INTRAVENOUS at 15:41

## 2022-08-12 NOTE — PROGRESS NOTES
1937- ALS transport arranged with Viky Montgomery from [facility]. ETA of 2215 given. 1047- [facility] updated ETA for 2310.

## 2022-08-12 NOTE — PROGRESS NOTES
Compazine 10 mg IVP was therapeutically interchanged for Promethazine (Phenergan) 25 mg IVPB per the P&T Committee approved Therapeutic Interchanges Policy.     [address] [place], Pharmacist  8/12/2022 3:23 PM

## 2022-08-12 NOTE — ED PROVIDER NOTES
EMERGENCY DEPARTMENT HISTORY AND PHYSICAL EXAM      Date: 8/12/2022  Patient Name: Yara Mcknight. History of Presenting Illness     Chief Complaint   Patient presents with    Abdominal Pain       History Provided By: Patient    HPI: Yara Mcknight., 48 y.o. male with PMHx significant for DM, hyperlipidemia, hypertension, presents ambulatory to the ED with cc of abdominal pain, nausea and vomiting for the past 2 days. Patient has had prior bouts of alcohol induced pancreatitis. Symptoms similar to prior pancreatitis. He thinks he has vomited least 100 times since yesterday. Denies any hematemesis or coffee-ground emesis. No black or tarry stools. He is also had some loose stools and diarrhea. No rectal bleeding. He is an insulin-dependent diabetic. He reports he has not taken his insulin since he has been able to keep anything down. Fevers or chills. No prior abdominal surgeries other than cholecystectomy. No treatment prior to arrival.  Previously was a heavy drinker but he cut down dramatically. He reports he drinks about 2 or 3 beers a day. PMHx: Significant for DM, hypertension, hyperlipidemia  PSHx: Significant for cholecystectomy  Social Hx: FOrmer smoker. Drinks 2 or 3 beers a day. There are no other complaints, changes, or physical findings at this time. PCP: None    No current facility-administered medications on file prior to encounter. Current Outpatient Medications on File Prior to Encounter   Medication Sig Dispense Refill    nystatin (MYCOSTATIN) powder Apply  to affected area three (3) times daily. 60 g 0    clotrimazole (LOTRIMIN) 1 % topical cream Apply  to affected area two (2) times a day for 30 days. Apply twice a day for 2 weeks or for 2 days after  rash resolves 60 g 0    ibuprofen (MOTRIN) 600 mg tablet Take 1 Tablet by mouth every six (6) hours as needed for Pain.  20 Tablet 0    diphenoxylate-atropine (LomotiL) 2.5-0.025 mg per tablet Take 1 Tab by mouth four (4) times daily as needed for Diarrhea (1 tab after each stool for max 8 per day). Max Daily Amount: 4 Tabs. Take after each stool for a maximum of 8 tablets daily 20 Tab 0    promethazine (PHENERGAN) 25 mg tablet Take 1 Tab by mouth every six (6) hours as needed for Nausea for up to 20 doses. 12 Tab 0    naloxone (NARCAN) 4 mg/actuation nasal spray Use 1 spray intranasally, then discard. Repeat with new spray every 2 min as needed for opioid overdose symptoms, alternating nostrils. 1 Each 1    lisinopril (PRINIVIL, ZESTRIL) 5 mg tablet Take 1 Tab by mouth daily. 30 Tab 0    metFORMIN (GLUCOPHAGE) 500 mg tablet Take 2 Tabs by mouth two (2) times daily (with meals). 60 Tab 0    ondansetron (ZOFRAN ODT) 4 mg disintegrating tablet Take 1 Tab by mouth every eight (8) hours as needed for Nausea. 8 Tab 0       Past History     Past Medical History:  Past Medical History:   Diagnosis Date    Diabetes ([facility] [address]     Hernia, abdominal     Hypercholesteremia     Lyme disease     Pancreatitis     Restless leg syndrome        Past Surgical History:  Past Surgical History:   Procedure Laterality Date    HX CHOLECYSTECTOMY         Family History:  Family History   Problem Relation Age of Onset    Cancer Mother     Diabetes Mother     Hypertension Father        Social History:  Social History     Tobacco Use    Smoking status: Former    Smokeless tobacco: Never   Substance Use Topics    Alcohol use: Not Currently     Comment: beer    Drug use: Not Currently       Allergies:  No Known Allergies      Review of Systems   Review of Systems   Constitutional:  Negative for activity change, chills and fever. HENT:  Negative for congestion and sore throat. Eyes:  Negative for pain and redness. Respiratory:  Negative for cough, chest tightness and shortness of breath. Cardiovascular:  Negative for chest pain and palpitations. Gastrointestinal:  Positive for abdominal pain, nausea and vomiting. Negative for diarrhea. Genitourinary:  Negative for dysuria, frequency and urgency. Musculoskeletal:  Negative for back pain and neck pain. Skin:  Negative for rash. Neurological:  Negative for syncope, light-headedness and headaches. Psychiatric/Behavioral:  Negative for confusion. All other systems reviewed and are negative. Physical Exam   Physical Exam  Vitals and nursing note reviewed. Constitutional:       General: He is not in acute distress. Appearance: He is well-developed. He is not diaphoretic. HENT:      Head: Normocephalic. Nose: Nose normal.      Mouth/Throat:      Pharynx: No oropharyngeal exudate. Eyes:      General: No scleral icterus. Conjunctiva/sclera: Conjunctivae normal.      Pupils: Pupils are equal, round, and reactive to light. Neck:      Thyroid: No thyromegaly. Vascular: No JVD. Trachea: No tracheal deviation. Cardiovascular:      Rate and Rhythm: Normal rate and regular rhythm. Heart sounds: No murmur heard. No friction rub. No gallop. Pulmonary:      Effort: Pulmonary effort is normal. No respiratory distress. Breath sounds: Normal breath sounds. No stridor. No wheezing or rales. Abdominal:      General: Bowel sounds are normal. There is no distension. Palpations: Abdomen is soft. Tenderness: There is abdominal tenderness. There is no guarding or rebound. Comments: Mild epigastric tenderness. Minimal voluntary guarding. Musculoskeletal:         General: Normal range of motion. Cervical back: Normal range of motion and neck supple. Lymphadenopathy:      Cervical: No cervical adenopathy. Skin:     General: Skin is warm and dry. Findings: No erythema or rash. Neurological:      General: No focal deficit present. Mental Status: He is alert and oriented to person, place, and time. Cranial Nerves: No cranial nerve deficit. Motor: No abnormal muscle tone.       Coordination: Coordination normal. Psychiatric:         Behavior: Behavior normal.           Diagnostic Study Results     Labs -     Recent Results (from the past 12 hour(s))   URINALYSIS W/ RFLX MICROSCOPIC    Collection Time: 08/12/22  2:05 PM   Result Value Ref Range    Color YELLOW/STRAW      Appearance CLEAR CLEAR      Specific gravity 1.020 1.003 - 1.030      pH (UA) 5.5 5.0 - 8.0      Protein TRACE (A) NEG mg/dL    Glucose >1,000 (A) NEG mg/dL    Ketone Negative NEG mg/dL    Bilirubin Negative NEG      Blood TRACE (A) NEG      Urobilinogen 0.2 0.2 - 1.0 EU/dL    Nitrites Negative NEG      Leukocyte Esterase Negative NEG     URINE MICROSCOPIC ONLY    Collection Time: 08/12/22  2:05 PM   Result Value Ref Range    WBC 0-4 0 - 4 /hpf    RBC 5-10 0 - 5 /hpf    Epithelial cells FEW FEW /lpf    Bacteria Negative NEG /hpf   CBC WITH AUTOMATED DIFF    Collection Time: 08/12/22  2:30 PM   Result Value Ref Range    WBC 22.8 (H) 4.1 - 11.1 K/uL    RBC 5.37 4.10 - 5.70 M/uL    HGB 16.3 12.1 - 17.0 g/dL    HCT 44.6 36.6 - 50.3 %    MCV 83.1 80.0 - 99.0 FL    MCH 30.4 26.0 - 34.0 PG    MCHC 36.5 30.0 - 36.5 g/dL    RDW 13.2 11.5 - 14.5 %    PLATELET 918 (H) 458 - 400 K/uL    MPV 9.6 8.9 - 12.9 FL    NRBC 0.0 0  WBC    ABSOLUTE NRBC 0.00 0.00 - 0.01 K/uL    NEUTROPHILS 88 (H) 32 - 75 %    LYMPHOCYTES 7 (L) 12 - 49 %    MONOCYTES 4 (L) 5 - 13 %    EOSINOPHILS 0 0 - 7 %    BASOPHILS 0 0 - 1 %    IMMATURE GRANULOCYTES 1 (H) 0.0 - 0.5 %    ABS. NEUTROPHILS 20.1 (H) 1.8 - 8.0 K/UL    ABS. LYMPHOCYTES 1.6 0.8 - 3.5 K/UL    ABS. MONOCYTES 0.9 0.0 - 1.0 K/UL    ABS. EOSINOPHILS 0.0 0.0 - 0.4 K/UL    ABS. BASOPHILS 0.0 0.0 - 0.1 K/UL    ABS. IMM.  GRANS. 0.2 (H) 0.00 - 0.04 K/UL    DF AUTOMATED      RBC COMMENTS NORMOCYTIC, NORMOCHROMIC     METABOLIC PANEL, COMPREHENSIVE    Collection Time: 08/12/22  2:30 PM   Result Value Ref Range    Sodium 130 (L) 136 - 145 mmol/L    Potassium 4.6 3.5 - 5.1 mmol/L    Chloride 90 (L) 97 - 108 mmol/L    CO2 17 (L) 21 - 32 mmol/L    Anion gap 23 (H) 5 - 15 mmol/L    Glucose 424 (H) 65 - 100 mg/dL    BUN 18 6 - 20 MG/DL    Creatinine 1.13 0.70 - 1.30 MG/DL    BUN/Creatinine ratio 16 12 - 20      GFR est AA >60 >60 ml/min/1.73m2    GFR est non-AA >60 >60 ml/min/1.73m2    Calcium 8.5 8.5 - 10.1 MG/DL    Bilirubin, total 0.9 0.2 - 1.0 MG/DL    ALT (SGPT) 62 12 - 78 U/L    AST (SGOT) 39 (H) 15 - 37 U/L    Alk. phosphatase 127 (H) 45 - 117 U/L    Protein, total 8.8 (H) 6.4 - 8.2 g/dL    Albumin 4.6 3.5 - 5.0 g/dL    Globulin 4.2 (H) 2.0 - 4.0 g/dL    A-G Ratio 1.1 1.1 - 2.2     LIPASE    Collection Time: 08/12/22  2:30 PM   Result Value Ref Range    Lipase >3,000 (H) 73 - 393 U/L   GLUCOSE, POC    Collection Time: 08/12/22  4:04 PM   Result Value Ref Range    Glucose (POC) 315 (H) 65 - 117 mg/dL    Performed by Lonny Watson    Collection Time: 08/12/22  4:28 PM   Result Value Ref Range    Glucose 315 mg/dL    Insulin order 5.1 units/hour    Insulin adminstered 8.1 units/hour    Multiplier 0.020     Low target 150 mg/dL    High target 250 mg/dL    D50 order 0.0 ml    D50 administered 0.00 ml    Minutes until next BG 60 min    Order initials LW     Administered initials LW    BLOOD GAS, ARTERIAL    Collection Time: 08/12/22  4:38 PM   Result Value Ref Range    pH 7.44 7.35 - 7.45      PCO2 26 (L) 35 - 45 mmHg    PO2 95 80 - 100 mmHg    O2 SAT 98 (H) 92 - 97 %    BICARBONATE 18 (L) 22 - 26 mmol/L    BASE DEFICIT 4.8 mmol/L    O2 METHOD ROOM AIR      FIO2 21 %    Sample source ARTERIAL      SITE RIGHT RADIAL      BRAD'S TEST YES     GLUCOSE, POC    Collection Time: 08/12/22  5:15 PM   Result Value Ref Range    Glucose (POC) 258 (H) 65 - 117 mg/dL    Performed by Dusty Montejo    GLUCOSE, POC    Collection Time: 08/12/22  6:13 PM   Result Value Ref Range    Glucose (POC) 207 (H) 65 - 117 mg/dL    Performed by Dustybarbara Montejo        Radiologic Studies -   CT ABD PELV W CONT   Final Result   Acute uncomplicated pancreatitis. Incidentals as above. CT Results  (Last 48 hours)                 08/12/22 1552  CT ABD PELV W CONT Final result    Impression:  Acute uncomplicated pancreatitis. Incidentals as above. Narrative:  EXAM: CT ABD PELV W CONT       INDICATION: abdominal pain       COMPARISON: 7/7/2020. CONTRAST: 100 mL of Isovue-370. TECHNIQUE:    Following the uneventful intravenous administration of contrast, thin axial   images were obtained through the abdomen and pelvis. Coronal and sagittal   reconstructions were generated. Oral contrast was not administered. CT dose   reduction was achieved through use of a standardized protocol tailored for this   examination and automatic exposure control for dose modulation. FINDINGS:    LOWER THORAX: The heart is normal in size without pericardial effusion. Coronary   artery calcifications are noted. Visualized lung bases are clear. LIVER: No mass. BILIARY TREE: The bladder is surgically absent. CBD is not dilated. SPLEEN: Unremarkable. PANCREAS: Extensive peripancreatic inflammatory change with no collection or   evident mass or duct dilation. Peripancreatic vascular structures opacify   normally with no occlusion, pseudoaneurysm, or dissection. ADRENALS: Unremarkable. KIDNEYS: No mass, calculus, or hydronephrosis. STOMACH: Unremarkable. SMALL BOWEL: No dilatation or wall thickening. COLON: No dilatation or wall thickening. APPENDIX: Unremarkable. PERITONEUM: No ascites or pneumoperitoneum. RETROPERITONEUM: Atherosclerotic calcination without aneurysm or dissection. No   enlarged lymphadenopathy. REPRODUCTIVE ORGANS: Prostate and seminal vesicles are unremarkable. URINARY BLADDER: No mass or calculus. BONES: Degenerative spine change. No acute fracture or aggressive lesion. ABDOMINAL WALL: No mass or hernia.    ADDITIONAL COMMENTS: N/A                 CXR Results  (Last 48 hours)      None              Medical Decision Making   I am the first provider for this patient. I reviewed the vital signs, available nursing notes, past medical history, past surgical history, family history and social history. Vital Signs-Reviewed the patient's vital signs. Patient Vitals for the past 12 hrs:   Temp Pulse Resp BP SpO2   08/12/22 1831 -- 72 15 (!) 153/78 99 %   08/12/22 1804 -- 91 29 (!) 143/67 99 %   08/12/22 1657 -- 74 19 -- --   08/12/22 1410 98.6 °F (37 °C) (!) 101 16 (!) 158/91 98 %           Records Reviewed: Nursing notes reviewed    Provider Notes (Medical Decision Making):   DDX: DKA, pancreatitis, dehydration    ED Course:   Initial assessment performed. The patients presenting problems have been discussed, and they are in agreement with the care plan formulated and outlined with them. I have encouraged them to ask questions as they arise throughout their visit. ED Course as of 09/19/22 1403   Fri Aug 12, 2022   1526 Patient with DKA and acute pancreatitis. Anion gap of 23. Potassium 4.6. Blood sugar 424. Starting insulin drip. Acute pancreatitis with lipase greater than 3000. WBC elevated at 22.8. Obtaining CT abdomen pelvis with contrast. [CH]      ED Course User Index  [CH] Hany Palomares MD       I spoke with Dr. Chaka Harper, hospitalist at [facility]. Reviewed patient's history, presentation, labs and imaging findings. He excepted the patient as transfer to [facility] stepdown bed. No beds currently available in ER boarding. Patient on wait list for bed available.     CRITICAL CARE NOTE :    6:49 PM      IMPENDING DETERIORATION -Cardiovascular, CNS, and Metabolic    ASSOCIATED RISK FACTORS - Hypotension, Metabolic changes, Dehydration, Vascular Compromise, and CNS Decompensation    MANAGEMENT- Bedside Assessment, Supervision of Care, and Transfer    INTERPRETATION -  Xrays, CT Scan, Blood Gases, and Blood Pressure    INTERVENTIONS - hemodynamic mngmt and Metobolic interventions, insulin  Drip, fluid resuscitation    CASE REVIEW - Hospitalist/Intensivist, Medical Sub-Specialist, Nursing, and Family    TREATMENT RESPONSE -Improved    PERFORMED BY - Self        NOTES   :      I have spent 45 minutes of critical care time involved in lab review, consultations with specialist, family decision- making, bedside attention and documentation. During this entire length of time I was immediately available to the patient . Critical care time claimed does not include time spent on separately billed procedures. Critical Care: The reason for providing this level of medical care for this critically ill patient was due to a critical illness that impaired one or more vital organ systems, such that there was a high probability of imminent or life threatening deterioration in the patient's condition. This care involved high complexity decision making to assess, manipulate, and support vital system functions, to treat this degree of vital organ system failure, and to prevent further life threatening deterioration of the patients condition. Aure Wynn MD     PROGRESS NOTE    Pt reevaluated. Written by Aure Wynn MD             Critical Care Time:   0    Disposition:  Transfer to [place] [id number].:  1. Current Discharge Medication List        2. Follow-up Information    None       Return to ED if worse     Diagnosis     Clinical Impression:   1. Type 1 diabetes mellitus with ketoacidosis without coma ([facility] [address]    2. Acute pancreatitis, unspecified complication status, unspecified pancreatitis type              Please note that this dictation was completed with [employer], the computer voice recognition software. Quite often unanticipated grammatical, syntax, homophones, and other interpretive errors are inadvertently transcribed by the computer software. Please disregard these errors. Please excuse any errors that have escaped final proofreading.

## 2022-08-13 ENCOUNTER — HOSPITAL ENCOUNTER (OUTPATIENT)
Age: 47
Setting detail: OBSERVATION
Discharge: LEFT BEFORE DISCHARGE | End: 2022-08-14
Attending: INTERNAL MEDICINE | Admitting: INTERNAL MEDICINE
Payer: COMMERCIAL

## 2022-08-13 ENCOUNTER — APPOINTMENT (OUTPATIENT)
Dept: ULTRASOUND IMAGING | Age: 47
End: 2022-08-13
Attending: INTERNAL MEDICINE
Payer: COMMERCIAL

## 2022-08-13 VITALS
DIASTOLIC BLOOD PRESSURE: 76 MMHG | RESPIRATION RATE: 23 BRPM | SYSTOLIC BLOOD PRESSURE: 150 MMHG | HEART RATE: 78 BPM | TEMPERATURE: 98.8 F | OXYGEN SATURATION: 99 %

## 2022-08-13 PROBLEM — E11.10 DKA (DIABETIC KETOACIDOSIS) (HCC): Status: ACTIVE | Noted: 2022-08-13

## 2022-08-13 LAB
ADMINISTERED INITIALS, ADMINIT: NORMAL
ANION GAP SERPL CALC-SCNC: 10 MMOL/L (ref 5–15)
ANION GAP SERPL CALC-SCNC: 8 MMOL/L (ref 5–15)
BUN SERPL-MCNC: 13 MG/DL (ref 6–20)
BUN SERPL-MCNC: 15 MG/DL (ref 6–20)
BUN/CREAT SERPL: 15 (ref 12–20)
BUN/CREAT SERPL: 17 (ref 12–20)
CALCIUM SERPL-MCNC: 7.9 MG/DL (ref 8.5–10.1)
CALCIUM SERPL-MCNC: 7.9 MG/DL (ref 8.5–10.1)
CHLORIDE SERPL-SCNC: 101 MMOL/L (ref 97–108)
CHLORIDE SERPL-SCNC: 102 MMOL/L (ref 97–108)
CO2 SERPL-SCNC: 22 MMOL/L (ref 21–32)
CO2 SERPL-SCNC: 25 MMOL/L (ref 21–32)
CREAT SERPL-MCNC: 0.86 MG/DL (ref 0.7–1.3)
CREAT SERPL-MCNC: 0.9 MG/DL (ref 0.7–1.3)
D50 ADMINISTERED, D50ADM: 0 ML
D50 ORDER, D50ORD: 0 ML
EST. AVERAGE GLUCOSE BLD GHB EST-MCNC: 220 MG/DL
GLUCOSE BLD STRIP.AUTO-MCNC: 110 MG/DL (ref 65–117)
GLUCOSE BLD STRIP.AUTO-MCNC: 167 MG/DL (ref 65–117)
GLUCOSE BLD STRIP.AUTO-MCNC: 174 MG/DL (ref 65–117)
GLUCOSE BLD STRIP.AUTO-MCNC: 175 MG/DL (ref 65–117)
GLUCOSE BLD STRIP.AUTO-MCNC: 177 MG/DL (ref 65–117)
GLUCOSE BLD STRIP.AUTO-MCNC: 186 MG/DL (ref 65–117)
GLUCOSE BLD STRIP.AUTO-MCNC: 194 MG/DL (ref 65–117)
GLUCOSE BLD STRIP.AUTO-MCNC: 200 MG/DL (ref 65–117)
GLUCOSE BLD STRIP.AUTO-MCNC: 252 MG/DL (ref 65–117)
GLUCOSE SERPL-MCNC: 186 MG/DL (ref 65–100)
GLUCOSE SERPL-MCNC: 222 MG/DL (ref 65–100)
GLUCOSE, GLC: 174 MG/DL
GLUCOSE, GLC: 175 MG/DL
GLUCOSE, GLC: 186 MG/DL
GLUCOSE, GLC: 194 MG/DL
GLUCOSE, GLC: 200 MG/DL
GLUCOSE, GLC: 252 MG/DL
HBA1C MFR BLD: 9.3 % (ref 4–5.6)
HIGH TARGET, HITG: 250 MG/DL
INSULIN ADMINSTERED, INSADM: 2.3 UNITS/HOUR
INSULIN ADMINSTERED, INSADM: 2.3 UNITS/HOUR
INSULIN ADMINSTERED, INSADM: 2.5 UNITS/HOUR
INSULIN ADMINSTERED, INSADM: 2.7 UNITS/HOUR
INSULIN ADMINSTERED, INSADM: 2.8 UNITS/HOUR
INSULIN ADMINSTERED, INSADM: 3.8 UNITS/HOUR
INSULIN ORDER, INSORD: 2.3 UNITS/HOUR
INSULIN ORDER, INSORD: 2.3 UNITS/HOUR
INSULIN ORDER, INSORD: 2.5 UNITS/HOUR
INSULIN ORDER, INSORD: 2.7 UNITS/HOUR
INSULIN ORDER, INSORD: 2.8 UNITS/HOUR
INSULIN ORDER, INSORD: 3.8 UNITS/HOUR
LACTATE SERPL-SCNC: 1.3 MMOL/L (ref 0.4–2)
LOW TARGET, LOT: 150 MG/DL
MAGNESIUM SERPL-MCNC: 1.4 MG/DL (ref 1.6–2.4)
MINUTES UNTIL NEXT BG, NBG: 120 MIN
MINUTES UNTIL NEXT BG, NBG: 60 MIN
MULTIPLIER, MUL: 0.02
ORDER INITIALS, ORDINIT: NORMAL
PHOSPHATE SERPL-MCNC: 1.7 MG/DL (ref 2.6–4.7)
POTASSIUM SERPL-SCNC: 3.4 MMOL/L (ref 3.5–5.1)
POTASSIUM SERPL-SCNC: 3.5 MMOL/L (ref 3.5–5.1)
PROCALCITONIN SERPL-MCNC: <0.05 NG/ML
SERVICE CMNT-IMP: ABNORMAL
SERVICE CMNT-IMP: NORMAL
SODIUM SERPL-SCNC: 133 MMOL/L (ref 136–145)
SODIUM SERPL-SCNC: 135 MMOL/L (ref 136–145)
TRIGL SERPL-MCNC: 172 MG/DL (ref ?–150)

## 2022-08-13 PROCEDURE — 87040 BLOOD CULTURE FOR BACTERIA: CPT

## 2022-08-13 PROCEDURE — 96375 TX/PRO/DX INJ NEW DRUG ADDON: CPT

## 2022-08-13 PROCEDURE — 82962 GLUCOSE BLOOD TEST: CPT

## 2022-08-13 PROCEDURE — 65660000001 HC RM ICU INTERMED STEPDOWN

## 2022-08-13 PROCEDURE — 83605 ASSAY OF LACTIC ACID: CPT

## 2022-08-13 PROCEDURE — 96365 THER/PROPH/DIAG IV INF INIT: CPT

## 2022-08-13 PROCEDURE — 74011250636 HC RX REV CODE- 250/636: Performed by: INTERNAL MEDICINE

## 2022-08-13 PROCEDURE — 74011636637 HC RX REV CODE- 636/637: Performed by: INTERNAL MEDICINE

## 2022-08-13 PROCEDURE — 84100 ASSAY OF PHOSPHORUS: CPT

## 2022-08-13 PROCEDURE — 74011000258 HC RX REV CODE- 258: Performed by: INTERNAL MEDICINE

## 2022-08-13 PROCEDURE — 74011000250 HC RX REV CODE- 250: Performed by: INTERNAL MEDICINE

## 2022-08-13 PROCEDURE — 83036 HEMOGLOBIN GLYCOSYLATED A1C: CPT

## 2022-08-13 PROCEDURE — 36415 COLL VENOUS BLD VENIPUNCTURE: CPT

## 2022-08-13 PROCEDURE — 74011000258 HC RX REV CODE- 258: Performed by: HOSPITALIST

## 2022-08-13 PROCEDURE — 83735 ASSAY OF MAGNESIUM: CPT

## 2022-08-13 PROCEDURE — 76705 ECHO EXAM OF ABDOMEN: CPT

## 2022-08-13 PROCEDURE — 74011250637 HC RX REV CODE- 250/637: Performed by: INTERNAL MEDICINE

## 2022-08-13 PROCEDURE — 80048 BASIC METABOLIC PNL TOTAL CA: CPT

## 2022-08-13 PROCEDURE — G0378 HOSPITAL OBSERVATION PER HR: HCPCS

## 2022-08-13 PROCEDURE — 74011636637 HC RX REV CODE- 636/637: Performed by: EMERGENCY MEDICINE

## 2022-08-13 PROCEDURE — 84478 ASSAY OF TRIGLYCERIDES: CPT

## 2022-08-13 PROCEDURE — 84145 PROCALCITONIN (PCT): CPT

## 2022-08-13 PROCEDURE — 96376 TX/PRO/DX INJ SAME DRUG ADON: CPT

## 2022-08-13 PROCEDURE — 74011000258 HC RX REV CODE- 258: Performed by: EMERGENCY MEDICINE

## 2022-08-13 PROCEDURE — 96366 THER/PROPH/DIAG IV INF ADDON: CPT

## 2022-08-13 RX ORDER — MAGNESIUM SULFATE 100 %
4 CRYSTALS MISCELLANEOUS AS NEEDED
Status: DISCONTINUED | OUTPATIENT
Start: 2022-08-13 | End: 2022-08-14 | Stop reason: HOSPADM

## 2022-08-13 RX ORDER — ONDANSETRON 2 MG/ML
4 INJECTION INTRAMUSCULAR; INTRAVENOUS
Status: DISCONTINUED | OUTPATIENT
Start: 2022-08-13 | End: 2022-08-14 | Stop reason: HOSPADM

## 2022-08-13 RX ORDER — POLYETHYLENE GLYCOL 3350 17 G/17G
17 POWDER, FOR SOLUTION ORAL DAILY PRN
Status: DISCONTINUED | OUTPATIENT
Start: 2022-08-13 | End: 2022-08-14 | Stop reason: HOSPADM

## 2022-08-13 RX ORDER — SODIUM CHLORIDE 0.9 % (FLUSH) 0.9 %
5-40 SYRINGE (ML) INJECTION AS NEEDED
Status: DISCONTINUED | OUTPATIENT
Start: 2022-08-13 | End: 2022-08-14 | Stop reason: HOSPADM

## 2022-08-13 RX ORDER — PROMETHAZINE HYDROCHLORIDE 25 MG/1
12.5 TABLET ORAL
Status: DISCONTINUED | OUTPATIENT
Start: 2022-08-13 | End: 2022-08-14 | Stop reason: HOSPADM

## 2022-08-13 RX ORDER — INSULIN GLARGINE 100 [IU]/ML
10 INJECTION, SOLUTION SUBCUTANEOUS DAILY
Status: DISCONTINUED | OUTPATIENT
Start: 2022-08-13 | End: 2022-08-14 | Stop reason: HOSPADM

## 2022-08-13 RX ORDER — DEXTROSE MONOHYDRATE AND SODIUM CHLORIDE 5; .9 G/100ML; G/100ML
100 INJECTION, SOLUTION INTRAVENOUS CONTINUOUS
Status: DISCONTINUED | OUTPATIENT
Start: 2022-08-13 | End: 2022-08-13

## 2022-08-13 RX ORDER — INSULIN LISPRO 100 [IU]/ML
INJECTION, SOLUTION INTRAVENOUS; SUBCUTANEOUS
Status: DISCONTINUED | OUTPATIENT
Start: 2022-08-13 | End: 2022-08-14 | Stop reason: HOSPADM

## 2022-08-13 RX ORDER — POTASSIUM CHLORIDE, DEXTROSE MONOHYDRATE AND SODIUM CHLORIDE 300; 5; 900 MG/100ML; G/100ML; MG/100ML
INJECTION, SOLUTION INTRAVENOUS CONTINUOUS
Status: DISCONTINUED | OUTPATIENT
Start: 2022-08-13 | End: 2022-08-13

## 2022-08-13 RX ORDER — ACETAMINOPHEN 325 MG/1
650 TABLET ORAL
Status: DISCONTINUED | OUTPATIENT
Start: 2022-08-13 | End: 2022-08-14 | Stop reason: HOSPADM

## 2022-08-13 RX ORDER — INSULIN LISPRO 100 [IU]/ML
INJECTION, SOLUTION INTRAVENOUS; SUBCUTANEOUS
Status: DISCONTINUED | OUTPATIENT
Start: 2022-08-13 | End: 2022-08-13

## 2022-08-13 RX ORDER — ENOXAPARIN SODIUM 100 MG/ML
40 INJECTION SUBCUTANEOUS DAILY
Status: DISCONTINUED | OUTPATIENT
Start: 2022-08-13 | End: 2022-08-14 | Stop reason: HOSPADM

## 2022-08-13 RX ORDER — SODIUM CHLORIDE 9 MG/ML
100 INJECTION, SOLUTION INTRAVENOUS CONTINUOUS
Status: DISCONTINUED | OUTPATIENT
Start: 2022-08-13 | End: 2022-08-13

## 2022-08-13 RX ORDER — ACETAMINOPHEN 650 MG/1
650 SUPPOSITORY RECTAL
Status: DISCONTINUED | OUTPATIENT
Start: 2022-08-13 | End: 2022-08-14 | Stop reason: HOSPADM

## 2022-08-13 RX ORDER — MORPHINE SULFATE 2 MG/ML
2 INJECTION, SOLUTION INTRAMUSCULAR; INTRAVENOUS
Status: DISCONTINUED | OUTPATIENT
Start: 2022-08-13 | End: 2022-08-14 | Stop reason: HOSPADM

## 2022-08-13 RX ORDER — LISINOPRIL 5 MG/1
5 TABLET ORAL DAILY
Status: DISCONTINUED | OUTPATIENT
Start: 2022-08-13 | End: 2022-08-14 | Stop reason: HOSPADM

## 2022-08-13 RX ORDER — MAGNESIUM SULFATE 100 %
4 CRYSTALS MISCELLANEOUS AS NEEDED
Status: DISCONTINUED | OUTPATIENT
Start: 2022-08-13 | End: 2022-08-13

## 2022-08-13 RX ORDER — SODIUM CHLORIDE 0.9 % (FLUSH) 0.9 %
5-40 SYRINGE (ML) INJECTION EVERY 8 HOURS
Status: DISCONTINUED | OUTPATIENT
Start: 2022-08-13 | End: 2022-08-14 | Stop reason: HOSPADM

## 2022-08-13 RX ORDER — MORPHINE SULFATE 2 MG/ML
2 INJECTION, SOLUTION INTRAMUSCULAR; INTRAVENOUS
Status: COMPLETED | OUTPATIENT
Start: 2022-08-13 | End: 2022-08-13

## 2022-08-13 RX ORDER — DEXTROSE MONOHYDRATE 100 MG/ML
0-250 INJECTION, SOLUTION INTRAVENOUS AS NEEDED
Status: DISCONTINUED | OUTPATIENT
Start: 2022-08-13 | End: 2022-08-13

## 2022-08-13 RX ORDER — HYDROMORPHONE HYDROCHLORIDE 1 MG/ML
0.5 INJECTION, SOLUTION INTRAMUSCULAR; INTRAVENOUS; SUBCUTANEOUS ONCE
Status: COMPLETED | OUTPATIENT
Start: 2022-08-13 | End: 2022-08-13

## 2022-08-13 RX ADMIN — HYDROMORPHONE HYDROCHLORIDE 0.5 MG: 1 INJECTION, SOLUTION INTRAMUSCULAR; INTRAVENOUS; SUBCUTANEOUS at 04:26

## 2022-08-13 RX ADMIN — MORPHINE SULFATE 2 MG: 2 INJECTION, SOLUTION INTRAMUSCULAR; INTRAVENOUS at 09:00

## 2022-08-13 RX ADMIN — MORPHINE SULFATE 2 MG: 2 INJECTION, SOLUTION INTRAMUSCULAR; INTRAVENOUS at 18:57

## 2022-08-13 RX ADMIN — ONDANSETRON 4 MG: 2 INJECTION INTRAMUSCULAR; INTRAVENOUS at 03:47

## 2022-08-13 RX ADMIN — ENOXAPARIN SODIUM 40 MG: 100 INJECTION SUBCUTANEOUS at 09:00

## 2022-08-13 RX ADMIN — SODIUM CHLORIDE, PRESERVATIVE FREE 10 ML: 5 INJECTION INTRAVENOUS at 02:44

## 2022-08-13 RX ADMIN — MORPHINE SULFATE 2 MG: 2 INJECTION, SOLUTION INTRAMUSCULAR; INTRAVENOUS at 23:13

## 2022-08-13 RX ADMIN — Medication 0.5 UNITS/HR: at 00:00

## 2022-08-13 RX ADMIN — INSULIN GLARGINE 10 UNITS: 100 INJECTION, SOLUTION SUBCUTANEOUS at 09:59

## 2022-08-13 RX ADMIN — DEXTROSE MONOHYDRATE, SODIUM CHLORIDE, AND POTASSIUM CHLORIDE: 50; 9; 2.98 INJECTION, SOLUTION INTRAVENOUS at 09:59

## 2022-08-13 RX ADMIN — SODIUM CHLORIDE 100 ML/HR: 9 INJECTION, SOLUTION INTRAVENOUS at 02:36

## 2022-08-13 RX ADMIN — Medication 2 UNITS: at 17:38

## 2022-08-13 RX ADMIN — SODIUM CHLORIDE, PRESERVATIVE FREE 10 ML: 5 INJECTION INTRAVENOUS at 21:43

## 2022-08-13 RX ADMIN — LISINOPRIL 5 MG: 5 TABLET ORAL at 09:00

## 2022-08-13 RX ADMIN — SODIUM CHLORIDE, PRESERVATIVE FREE 10 ML: 5 INJECTION INTRAVENOUS at 06:15

## 2022-08-13 RX ADMIN — SODIUM CHLORIDE 3.8 UNITS/HR: 9 INJECTION, SOLUTION INTRAVENOUS at 03:13

## 2022-08-13 RX ADMIN — DEXTROSE AND SODIUM CHLORIDE 100 ML/HR: 5; 900 INJECTION, SOLUTION INTRAVENOUS at 04:58

## 2022-08-13 RX ADMIN — MORPHINE SULFATE 2 MG: 2 INJECTION, SOLUTION INTRAMUSCULAR; INTRAVENOUS at 14:14

## 2022-08-13 RX ADMIN — SODIUM CHLORIDE, PRESERVATIVE FREE 10 ML: 5 INJECTION INTRAVENOUS at 17:40

## 2022-08-13 NOTE — PROGRESS NOTES
Problem: Falls - Risk of  Goal: *Absence of Falls  Description: Document Andrew De Paz Fall Risk and appropriate interventions in the flowsheet. Outcome: Progressing Towards Goal  Note: Fall Risk Interventions:  Mobility Interventions: Bed/chair exit alarm         Medication Interventions: Bed/chair exit alarm, Patient to call before getting OOB         History of Falls Interventions: Bed/chair exit alarm         Problem: Falls - Risk of  Goal: *Absence of Falls  Description: Document Andrew De Paz Fall Risk and appropriate interventions in the flowsheet.   Outcome: Progressing Towards Goal  Note: Fall Risk Interventions:  Mobility Interventions: Bed/chair exit alarm         Medication Interventions: Bed/chair exit alarm, Patient to call before getting OOB         History of Falls Interventions: Bed/chair exit alarm

## 2022-08-13 NOTE — H&P
Hospitalist Admission Note    NAME: Rica Zapien :  1975   MRN:  [id number]     Date/Time:  2022 3:06 AM    Patient PCP: None  ______________________________________________________________________  Given the patient's current clinical presentation, I have a high level of concern for decompensation if discharged from the emergency department. Complex decision making was performed, which includes reviewing the patient's available past medical records, laboratory results, and x-ray films. My assessment of this patient's clinical condition and my plan of care is as follows.     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  - Analgesia  - IV fluids  - Lipase more than 3000 on admission  - Watch for alcohol withdrawal syndrome  - Counseling was provided on full abstain from alcohol    SIRS positive symptoms  - Leukocytosis 22,000 admission  - UA with no bacteriuria or pyuria  - SIRS positive likely secondary to dehydration and acute pancreatitis  - Send procalcitonin level depends on that decide on antibiotics  - Denying any UTI symptoms or upper respiratory infection symptoms    DKA, POA  High anion gap metabolic acidosis  - Lab work consistent with DKA  - On metformin and Lantus 10 units at home, hold for now  [facility] DKA protocol with insulin drip  - Follow BMP/magnesium/Phos every 4 hours  - Diabetic management team consulted  - Most recent hemoglobin A1c 7.7% in 2019, order new hemoglobin A1c level    Pseudohyponatremia from hyperglycemia  - Corrected sodium level normal  - Follow BMP closely  - Continue IV fluids    Code Status: Full code    DVT Prophylaxis: Lovenox  GI Prophylaxis: not indicated    Baseline: Active, independent      Subjective:   CHIEF COMPLAINT: Abdominal pain    HISTORY OF PRESENT ILLNESS:       The patient is 48years old man with past medical history significant for diabetes mellitus, alcohol induced pancreatitis presented to the emergency department at [facility] with abdominal pain, nausea and vomiting. His initial labs were consistent with DKA. CT abdomen revealed acute uncomplicated pancreatitis for which patient was transferred to [facility].  No upon arrival to [facility], patient was hemodynamically stable. He still complaining from some nausea but no vomiting reported. He is also complaining from some abdominal pain. We were asked to admit for work up and evaluation of the above problems. Past Medical History:   Diagnosis Date    Diabetes ([address]     Hernia, abdominal     Hypercholesteremia     Lyme disease     Pancreatitis     Restless leg syndrome         Past Surgical History:   Procedure Laterality Date    HX CHOLECYSTECTOMY         Social History     Tobacco Use    Smoking status: Former    Smokeless tobacco: Never   Substance Use Topics    Alcohol use: Not Currently     Comment: beer        Family History   Problem Relation Age of Onset    Cancer Mother     Diabetes Mother     Hypertension Father      No Known Allergies     Prior to Admission medications    Medication Sig Start Date End Date Taking? Authorizing Provider   nystatin (MYCOSTATIN) powder Apply  to affected area three (3) times daily. 8/9/22   Pilar Munguia MD   clotrimazole (LOTRIMIN) 1 % topical cream Apply  to affected area two (2) times a day for 30 days. Apply twice a day for 2 weeks or for 2 days after  rash resolves 8/9/22 9/8/22  Pilar Munguia MD   ibuprofen (MOTRIN) 600 mg tablet Take 1 Tablet by mouth every six (6) hours as needed for Pain.  7/26/22   Herman Solitario MD   diphenoxylate-atropine (LomotiL) 2.5-0.025 mg per tablet Take 1 Tab by mouth four (4) times daily as needed for Diarrhea (1 tab after each stool for max 8 per day). Max Daily Amount: 4 Tabs. Take after each stool for a maximum of 8 tablets daily 7/7/20   Chelsey Isaac MD   promethazine (PHENERGAN) 25 mg tablet Take 1 Tab by mouth every six (6) hours as needed for Nausea for up to 20 doses. 7/7/20   Chelsey Isaac MD   naloxone [facility]) 4 mg/actuation nasal spray Use 1 spray intranasally, then discard. Repeat with new spray every 2 min as needed for opioid overdose symptoms, alternating nostrils. 2/16/20   Lila Bustamante MD   lisinopril (PRINIVIL, ZESTRIL) 5 mg tablet Take 1 Tab by mouth daily. 2/11/20   Corry Arizmendi MD   metFORMIN (GLUCOPHAGE) 500 mg tablet Take 2 Tabs by mouth two (2) times daily (with meals). 2/11/20   Corry Arizmendi MD   ondansetron (ZOFRAN ODT) 4 mg disintegrating tablet Take 1 Tab by mouth every eight (8) hours as needed for Nausea. 10/14/19   Trevon White MD       REVIEW OF SYSTEMS:     I am not able to complete the review of systems because:    The patient is intubated and sedated    The patient has altered mental status due to his acute medical problems    The patient has baseline aphasia from prior stroke(s)    The patient has baseline dementia and is not reliable historian    The patient is in acute medical distress and unable to provide information           Total of 12 systems reviewed as follows:       POSITIVE= underlined text  Negative = text not underlined  General:  fever, chills, sweats, generalized weakness, weight loss/gain,      loss of appetite   Eyes:    blurred vision, eye pain, loss of vision, double vision  ENT:    rhinorrhea, pharyngitis   Respiratory:   cough, sputum production, SOB, CHANG, wheezing, pleuritic pain   Cardiology:   chest pain, palpitations, orthopnea, PND, edema, syncope   Gastrointestinal:  abdominal pain , N/V, diarrhea, dysphagia, constipation, bleeding Genitourinary:  frequency, urgency, dysuria, hematuria, incontinence   Muskuloskeletal :  arthralgia, myalgia, back pain  Hematology:  easy bruising, nose or gum bleeding, lymphadenopathy   Dermatological: rash, ulceration, pruritis, color change / jaundice  Endocrine:   hot flashes or polydipsia   Neurological:  headache, dizziness, confusion, focal weakness, paresthesia,     Speech difficulties, memory loss, gait difficulty  Psychological: Feelings of anxiety, depression, agitation    Objective:   VITALS:    Visit Vitals  BP (!) 158/73   Pulse 88   Temp 98.7 °F (37.1 °C)   Resp 23   SpO2 96%       PHYSICAL EXAM:    General:    Alert, cooperative, no distress, appears stated age. HEENT: Atraumatic, anicteric sclerae, pink conjunctivae     No oral ulcers, mucosa moist, throat clear, dentition fair  Neck:  Supple, symmetrical,  thyroid: non tender  Lungs:   Clear to auscultation bilaterally. No Wheezing or Rhonchi. No rales. Chest wall:  No tenderness  No Accessory muscle use. Heart:   Regular  rhythm,  No  murmur   No edema  Abdomen:   Soft, mild epigastric tenderness. Not distended. Bowel sounds normal  Extremities: No cyanosis. No clubbing,      Skin turgor normal, Capillary refill normal, Radial dial pulse 2+  Skin:     Not pale. Not Jaundiced  No rashes   Psych:  Good insight. Not depressed. Not anxious or agitated. Neurologic: EOMs intact. No facial asymmetry. No aphasia or slurred speech. Symmetrical strength, Sensation grossly intact.  Alert and oriented X 4.     _______________________________________________________________________  Care Plan discussed with:    Comments   Patient x    Family      RN x    Care Manager                    Consultant:      _______________________________________________________________________  Expected  Disposition:   Home with Family x   HH/PT/OT/RN    SNF/LTC    ELIANE    ________________________________________________________________________  TOTAL TIME:  68 Minutes    Critical Care Provided     Minutes non procedure based      Comments    x Reviewed previous records   >50% of visit spent in counseling and coordination of care x Discussion with patient and/or family and questions answered       ________________________________________________________________________  Signed: Verneita Dakins, MD    Procedures: see electronic medical records for all procedures/Xrays and details which were not copied into this note but were reviewed prior to creation of Plan. LAB DATA REVIEWED:    Recent Results (from the past 24 hour(s))   URINALYSIS W/ RFLX MICROSCOPIC    Collection Time: 08/12/22  2:05 PM   Result Value Ref Range    Color YELLOW/STRAW      Appearance CLEAR CLEAR      Specific gravity 1.020 1.003 - 1.030      pH (UA) 5.5 5.0 - 8.0      Protein TRACE (A) NEG mg/dL    Glucose >1,000 (A) NEG mg/dL    Ketone Negative NEG mg/dL    Bilirubin Negative NEG      Blood TRACE (A) NEG      Urobilinogen 0.2 0.2 - 1.0 EU/dL    Nitrites Negative NEG      Leukocyte Esterase Negative NEG     URINE MICROSCOPIC ONLY    Collection Time: 08/12/22  2:05 PM   Result Value Ref Range    WBC 0-4 0 - 4 /hpf    RBC 5-10 0 - 5 /hpf    Epithelial cells FEW FEW /lpf    Bacteria Negative NEG /hpf   CBC WITH AUTOMATED DIFF    Collection Time: 08/12/22  2:30 PM   Result Value Ref Range    WBC 22.8 (H) 4.1 - 11.1 K/uL    RBC 5.37 4.10 - 5.70 M/uL    HGB 16.3 12.1 - 17.0 g/dL    HCT 44.6 36.6 - 50.3 %    MCV 83.1 80.0 - 99.0 FL    MCH 30.4 26.0 - 34.0 PG    MCHC 36.5 30.0 - 36.5 g/dL    RDW 13.2 11.5 - 14.5 %    PLATELET 060 (H) 203 - 400 K/uL    MPV 9.6 8.9 - 12.9 FL    NRBC 0.0 0  WBC    ABSOLUTE NRBC 0.00 0.00 - 0.01 K/uL    NEUTROPHILS 88 (H) 32 - 75 %    LYMPHOCYTES 7 (L) 12 - 49 %    MONOCYTES 4 (L) 5 - 13 %    EOSINOPHILS 0 0 - 7 %    BASOPHILS 0 0 - 1 %    IMMATURE GRANULOCYTES 1 (H) 0.0 - 0.5 %    ABS. NEUTROPHILS 20.1 (H) 1.8 - 8.0 K/UL    ABS. LYMPHOCYTES 1.6 0.8 - 3.5 K/UL    ABS. MONOCYTES 0.9 0.0 - 1.0 K/UL    ABS. EOSINOPHILS 0.0 0.0 - 0.4 K/UL    ABS. BASOPHILS 0.0 0.0 - 0.1 K/UL    ABS. IMM. GRANS. 0.2 (H) 0.00 - 0.04 K/UL    DF AUTOMATED      RBC COMMENTS NORMOCYTIC, NORMOCHROMIC     METABOLIC PANEL, COMPREHENSIVE    Collection Time: 08/12/22  2:30 PM   Result Value Ref Range    Sodium 130 (L) 136 - 145 mmol/L    Potassium 4.6 3.5 - 5.1 mmol/L    Chloride 90 (L) 97 - 108 mmol/L    CO2 17 (L) 21 - 32 mmol/L    Anion gap 23 (H) 5 - 15 mmol/L    Glucose 424 (H) 65 - 100 mg/dL    BUN 18 6 - 20 MG/DL    Creatinine 1.13 0.70 - 1.30 MG/DL    BUN/Creatinine ratio 16 12 - 20      GFR est AA >60 >60 ml/min/1.73m2    GFR est non-AA >60 >60 ml/min/1.73m2    Calcium 8.5 8.5 - 10.1 MG/DL    Bilirubin, total 0.9 0.2 - 1.0 MG/DL    ALT (SGPT) 62 12 - 78 U/L    AST (SGOT) 39 (H) 15 - 37 U/L    Alk.  phosphatase 127 (H) 45 - 117 U/L    Protein, total 8.8 (H) 6.4 - 8.2 g/dL    Albumin 4.6 3.5 - 5.0 g/dL    Globulin 4.2 (H) 2.0 - 4.0 g/dL    A-G Ratio 1.1 1.1 - 2.2     LIPASE    Collection Time: 08/12/22  2:30 PM   Result Value Ref Range    Lipase >3,000 (H) 73 - 393 U/L   GLUCOSE, POC    Collection Time: 08/12/22  4:04 PM   Result Value Ref Range    Glucose (POC) 315 (H) 65 - 117 mg/dL    Performed by Suly Macario    Collection Time: 08/12/22  4:28 PM   Result Value Ref Range    Glucose 315 mg/dL    Insulin order 5.1 units/hour    Insulin adminstered 8.1 units/hour    Multiplier 0.020     Low target 150 mg/dL    High target 250 mg/dL    D50 order 0.0 ml    D50 administered 0.00 ml    Minutes until next BG 60 min    Order initials LW     Administered initials LW    BLOOD GAS, ARTERIAL    Collection Time: 08/12/22  4:38 PM   Result Value Ref Range    pH 7.44 7.35 - 7.45      PCO2 26 (L) 35 - 45 mmHg    PO2 95 80 - 100 mmHg    O2 SAT 98 (H) 92 - 97 %    BICARBONATE 18 (L) 22 - 26 mmol/L    BASE DEFICIT 4.8 mmol/L    O2 METHOD ROOM AIR      FIO2 21 %    Sample source ARTERIAL      SITE RIGHT RADIAL      BRAD'S TEST YES     GLUCOSE, POC    Collection Time: 08/12/22  5:15 PM   Result Value Ref Range    Glucose (POC) 258 (H) 65 - 117 mg/dL    Performed by Tima Keys    GLUCOSE, POC    Collection Time: 08/12/22  6:13 PM   Result Value Ref Range    Glucose (POC) 207 (H) 65 - 117 mg/dL    Performed by Tima Keys    GLUCOSE, POC    Collection Time: 08/12/22  8:27 PM   Result Value Ref Range    Glucose (POC) 57 (L) 65 - 117 mg/dL    Performed by Ranulfo Kasper (PCT)    GLUCOSE, POC    Collection Time: 08/12/22  8:28 PM   Result Value Ref Range    Glucose (POC) 62 (L) 65 - 117 mg/dL    Performed by Ranulfo Kasper (PCT)    GLUCOSTABILIZER    Collection Time: 08/12/22  8:44 PM   Result Value Ref Range    Glucose 62 mg/dL    Insulin order 0.0 units/hour    Insulin adminstered 0.0 units/hour    Multiplier 0.010     Low target 150 mg/dL    High target 250 mg/dL    D50 order 15.0 ml    D50 administered 15.00 ml    Minutes until next BG 15 min    Order initials LD     Administered initials RW    GLUCOSE, POC    Collection Time: 08/12/22  8:47 PM   Result Value Ref Range    Glucose (POC) 53 (LL) 65 - 117 mg/dL    Performed by Ranulfo Kasper (PCT)    GLUCOSE, POC    Collection Time: 08/12/22  8:48 PM   Result Value Ref Range    Glucose (POC) 75 65 - 117 mg/dL    Performed by Ranulfo Kasper (PCT)    GLUCOSTABILIZER    Collection Time: 08/12/22  8:54 PM   Result Value Ref Range    Glucose 75 mg/dL    Insulin order 0.0 units/hour    Insulin adminstered 0.0 units/hour    Multiplier 0.000     Low target 150 mg/dL    High target 250 mg/dL    D50 order 0.0 ml    D50 administered 0.00 ml    Minutes until next BG 60 min    Order initials LD     Administered initials RW    GLUCOSE, POC    Collection Time: 08/12/22 10:06 PM   Result Value Ref Range    Glucose (POC) 137 (H) 65 - 117 mg/dL    Performed by Ranulfo Kasper (PCT)    GLUCOSTABILIZER    Collection Time: 08/12/22 10:07 PM   Result Value Ref Range    Glucose 137 mg/dL    Insulin order 0.0 units/hour    Insulin adminstered 0.0 units/hour    Multiplier 0.000     Low target 150 mg/dL    High target 250 mg/dL    D50 order 0.0 ml    D50 administered 0.00 ml    Minutes until next BG 60 min    Order initials rw     Administered initials rw    GLUCOSE, POC    Collection Time: 08/12/22 11:17 PM   Result Value Ref Range    Glucose (POC) 177 (H) 65 - 117 mg/dL    Performed by Brissa Zendejas (PCT)    GLUCOSTABILIZER    Collection Time: 08/12/22 11:17 PM   Result Value Ref Range    Glucose 177 mg/dL    Insulin order 0.5 units/hour    Insulin adminstered 0.5 units/hour    Multiplier 0.000     Low target 150 mg/dL    High target 250 mg/dL    D50 order 0.0 ml    D50 administered 0.00 ml    Minutes until next BG 60 min    Order initials rw     Administered initials rw

## 2022-08-13 NOTE — PROGRESS NOTES
0700: Bedside shift change report given to Malu Harrison (oncoming nurse) by Chalo Bustamante RN (offgoing nurse). Report included the following information SBAR and Kardex. 0900: PRN Morphine given for Ab pain. 1000: Lantus given. Plan for DC insulin gtt in 2 hours. Anion gap closed. 1200: Stopped insulin gtt. 1415: PRN Morphine given for Ab pain. 1900: PRN morphine given for Ab pain. Report given to Jasmin Phillips.

## 2022-08-13 NOTE — PROGRESS NOTES
43-year-old male with history of alcohol abuse, diabetes mellitus admitted for acute pancreatitis, DKA.   Seen and examined  We will stop insulin drip and transition to subcutaneous insulin  Allow clear liquid diet, pain is better controlled  Accu-Cheks  Nonbillable round  Time: 25mn

## 2022-08-13 NOTE — ED NOTES
TRANSFER - OUT REPORT:    Verbal report given to Jennie Bachelor RN(name) on Rica Zapien  being transferred to [facility]25(unit) for routine progression of care       Report consisted of patients Situation, Background, Assessment and   Recommendations(SBAR). Information from the following report(s) SBAR, ED Summary, Intake/Output, MAR and Recent Results was reviewed with the receiving nurse. Lines:   Peripheral IV 08/12/22 Left Hand (Active)   Site Assessment Clean, dry, & intact 08/12/22 1436   Phlebitis Assessment 0 08/12/22 1436   Infiltration Assessment 0 08/12/22 1436   Dressing Status Clean, dry, & intact 08/12/22 1436   Dressing Type 4 X 4 08/12/22 1436   Hub Color/Line Status Pink 08/12/22 1436       Peripheral IV 08/12/22 Right Antecubital (Active)   Site Assessment Clean, dry, & intact 08/12/22 1704   Phlebitis Assessment 0 08/12/22 1704   Infiltration Assessment 0 08/12/22 1704   Dressing Status Clean, dry, & intact 08/12/22 1704   Hub Color/Line Status Pink 08/12/22 1704        Opportunity for questions and clarification was provided.       Patient transported with:   Monitor

## 2022-08-14 VITALS
OXYGEN SATURATION: 97 % | TEMPERATURE: 98.8 F | RESPIRATION RATE: 20 BRPM | SYSTOLIC BLOOD PRESSURE: 148 MMHG | DIASTOLIC BLOOD PRESSURE: 83 MMHG | HEART RATE: 89 BPM

## 2022-08-14 LAB
ALBUMIN SERPL-MCNC: 3.4 G/DL (ref 3.5–5)
ALBUMIN/GLOB SERPL: 0.8 {RATIO} (ref 1.1–2.2)
ALP SERPL-CCNC: 86 U/L (ref 45–117)
ALT SERPL-CCNC: 30 U/L (ref 12–78)
ANION GAP SERPL CALC-SCNC: 9 MMOL/L (ref 5–15)
AST SERPL-CCNC: 26 U/L (ref 15–37)
BASOPHILS # BLD: 0 K/UL (ref 0–0.1)
BASOPHILS NFR BLD: 0 % (ref 0–1)
BILIRUB SERPL-MCNC: 0.9 MG/DL (ref 0.2–1)
BUN SERPL-MCNC: 12 MG/DL (ref 6–20)
BUN/CREAT SERPL: 16 (ref 12–20)
CALCIUM SERPL-MCNC: 8.3 MG/DL (ref 8.5–10.1)
CHLORIDE SERPL-SCNC: 95 MMOL/L (ref 97–108)
CO2 SERPL-SCNC: 26 MMOL/L (ref 21–32)
CREAT SERPL-MCNC: 0.73 MG/DL (ref 0.7–1.3)
DIFFERENTIAL METHOD BLD: ABNORMAL
EOSINOPHIL # BLD: 0 K/UL (ref 0–0.4)
EOSINOPHIL NFR BLD: 0 % (ref 0–7)
ERYTHROCYTE [DISTWIDTH] IN BLOOD BY AUTOMATED COUNT: 13 % (ref 11.5–14.5)
GLOBULIN SER CALC-MCNC: 4.4 G/DL (ref 2–4)
GLUCOSE BLD STRIP.AUTO-MCNC: 143 MG/DL (ref 65–117)
GLUCOSE BLD STRIP.AUTO-MCNC: 184 MG/DL (ref 65–117)
GLUCOSE SERPL-MCNC: 198 MG/DL (ref 65–100)
HCT VFR BLD AUTO: 39.6 % (ref 36.6–50.3)
HGB BLD-MCNC: 14.6 G/DL (ref 12.1–17)
IMM GRANULOCYTES # BLD AUTO: 0 K/UL (ref 0–0.04)
IMM GRANULOCYTES NFR BLD AUTO: 0 % (ref 0–0.5)
LYMPHOCYTES # BLD: 1.8 K/UL (ref 0.8–3.5)
LYMPHOCYTES NFR BLD: 17 % (ref 12–49)
MCH RBC QN AUTO: 30.7 PG (ref 26–34)
MCHC RBC AUTO-ENTMCNC: 36.9 G/DL (ref 30–36.5)
MCV RBC AUTO: 83.4 FL (ref 80–99)
MONOCYTES # BLD: 0.5 K/UL (ref 0–1)
MONOCYTES NFR BLD: 5 % (ref 5–13)
NEUTS SEG # BLD: 8.2 K/UL (ref 1.8–8)
NEUTS SEG NFR BLD: 78 % (ref 32–75)
NRBC # BLD: 0 K/UL (ref 0–0.01)
NRBC BLD-RTO: 0 PER 100 WBC
PLATELET # BLD AUTO: 170 K/UL (ref 150–400)
PMV BLD AUTO: 9.6 FL (ref 8.9–12.9)
POTASSIUM SERPL-SCNC: 3.2 MMOL/L (ref 3.5–5.1)
PROT SERPL-MCNC: 7.8 G/DL (ref 6.4–8.2)
RBC # BLD AUTO: 4.75 M/UL (ref 4.1–5.7)
SERVICE CMNT-IMP: ABNORMAL
SERVICE CMNT-IMP: ABNORMAL
SODIUM SERPL-SCNC: 130 MMOL/L (ref 136–145)
WBC # BLD AUTO: 10.5 K/UL (ref 4.1–11.1)

## 2022-08-14 PROCEDURE — 74011636637 HC RX REV CODE- 636/637: Performed by: INTERNAL MEDICINE

## 2022-08-14 PROCEDURE — 80053 COMPREHEN METABOLIC PANEL: CPT

## 2022-08-14 PROCEDURE — 74011250636 HC RX REV CODE- 250/636: Performed by: INTERNAL MEDICINE

## 2022-08-14 PROCEDURE — 74011000250 HC RX REV CODE- 250: Performed by: INTERNAL MEDICINE

## 2022-08-14 PROCEDURE — 82962 GLUCOSE BLOOD TEST: CPT

## 2022-08-14 PROCEDURE — 74011250637 HC RX REV CODE- 250/637: Performed by: INTERNAL MEDICINE

## 2022-08-14 PROCEDURE — 96376 TX/PRO/DX INJ SAME DRUG ADON: CPT

## 2022-08-14 PROCEDURE — G0378 HOSPITAL OBSERVATION PER HR: HCPCS

## 2022-08-14 PROCEDURE — 85025 COMPLETE CBC W/AUTO DIFF WBC: CPT

## 2022-08-14 PROCEDURE — 36415 COLL VENOUS BLD VENIPUNCTURE: CPT

## 2022-08-14 RX ORDER — GLIPIZIDE 5 MG/1
2.5 TABLET ORAL 2 TIMES DAILY
Qty: 30 TABLET | Refills: 0 | Status: SHIPPED | OUTPATIENT
Start: 2022-08-14 | End: 2022-09-13

## 2022-08-14 RX ORDER — LANOLIN ALCOHOL/MO/W.PET/CERES
400 CREAM (GRAM) TOPICAL DAILY
Status: DISCONTINUED | OUTPATIENT
Start: 2022-08-14 | End: 2022-08-14 | Stop reason: HOSPADM

## 2022-08-14 RX ORDER — POTASSIUM CHLORIDE 750 MG/1
40 TABLET, FILM COATED, EXTENDED RELEASE ORAL
Status: DISCONTINUED | OUTPATIENT
Start: 2022-08-14 | End: 2022-08-14 | Stop reason: HOSPADM

## 2022-08-14 RX ORDER — SODIUM CHLORIDE 9 MG/ML
125 INJECTION, SOLUTION INTRAVENOUS CONTINUOUS
Status: DISCONTINUED | OUTPATIENT
Start: 2022-08-14 | End: 2022-08-14

## 2022-08-14 RX ADMIN — LISINOPRIL 5 MG: 5 TABLET ORAL at 09:36

## 2022-08-14 RX ADMIN — Medication 2 UNITS: at 09:36

## 2022-08-14 RX ADMIN — MORPHINE SULFATE 2 MG: 2 INJECTION, SOLUTION INTRAMUSCULAR; INTRAVENOUS at 06:54

## 2022-08-14 RX ADMIN — ENOXAPARIN SODIUM 40 MG: 100 INJECTION SUBCUTANEOUS at 09:36

## 2022-08-14 RX ADMIN — INSULIN GLARGINE 10 UNITS: 100 INJECTION, SOLUTION SUBCUTANEOUS at 09:36

## 2022-08-14 RX ADMIN — SODIUM CHLORIDE, PRESERVATIVE FREE 10 ML: 5 INJECTION INTRAVENOUS at 06:23

## 2022-08-14 NOTE — PROGRESS NOTES
Problem: Falls - Risk of  Goal: *Absence of Falls  Description: Document Hailey Pardo Fall Risk and appropriate interventions in the flowsheet.   Outcome: Resolved/Met  Goal: Absence of falls  Outcome: Resolved/Met     Problem: Patient Education: Go to Patient Education Activity  Goal: Patient/Family Education  Outcome: Resolved/Met     Problem: Patient Education: Go to Patient Education Activity  Goal: Patient/Family Education  Outcome: Resolved/Met     Problem: DKA: Day 1  Goal: Off Pathway (Use only if patient is Off Pathway)  Outcome: Resolved/Met  Goal: Activity/Safety  Outcome: Resolved/Met  Goal: Consults, if ordered  Outcome: Resolved/Met  Goal: Diagnostic Tests/Procedures, if Ordered  Outcome: Resolved/Met  Goal: Nutrition/Diet  Outcome: Resolved/Met  Goal: Discharge Planning  Outcome: Resolved/Met  Goal: Medications  Outcome: Resolved/Met  Goal: Respiratory  Outcome: Resolved/Met  Goal: Treatments/Interventions/Procedures  Outcome: Resolved/Met  Goal: Psychosocial  Outcome: Resolved/Met  Goal: *Hemodynamically stable  Outcome: Resolved/Met  Goal: *Blood glucose falling 50 to 100 mg/dl/hr  Outcome: Resolved/Met  Goal: *Potassium normalizing  Outcome: Resolved/Met     Problem: DKA: Day 2  Goal: Off Pathway (Use only if patient is Off Pathway)  Outcome: Resolved/Met  Goal: Activity/Safety  Outcome: Resolved/Met  Goal: Consults, if ordered  Outcome: Resolved/Met  Goal: Diagnostic Test/Procedures  Outcome: Resolved/Met  Goal: Nutrition/Diet  Outcome: Resolved/Met  Goal: Discharge Planning  Outcome: Resolved/Met  Goal: Medications  Outcome: Resolved/Met  Goal: Respiratory  Outcome: Resolved/Met  Goal: Treatments/Interventions/Procedures  Outcome: Resolved/Met  Goal: Psychosocial  Outcome: Resolved/Met  Goal: *Acidosis resolved  Outcome: Resolved/Met  Goal: *Tolerating diet  Outcome: Resolved/Met  Goal: *Demonstrates progressive activity  Outcome: Resolved/Met  Goal: *Blood glucose 80 to 180 mg/dl  Outcome: Resolved/Met     Problem: DKA: Day 3  Goal: Off Pathway (Use only if patient is Off Pathway)  Outcome: Resolved/Met  Goal: Activity/Safety  Outcome: Resolved/Met  Goal: Diagnostic Test/Procedures  Outcome: Resolved/Met  Goal: Nutrition/Diet  Outcome: Resolved/Met  Goal: Discharge Planning  Outcome: Resolved/Met  Goal: Medications  Outcome: Resolved/Met  Goal: Treatments/Interventions/Procedures  Outcome: Resolved/Met  Goal: Psychosocial  Outcome: Resolved/Met     Problem: DKA: Discharge Outcomes  Goal: *Ambulates and performs ADL's  Outcome: Resolved/Met  Goal: *Describes follow-up/return visits to physicians, diabetes treatment coordinator and other resources  Outcome: Resolved/Met  Goal: *Blood glucose at patient's target range  Outcome: Resolved/Met  Goal: *Acidosis resolved  Outcome: Resolved/Met  Goal: *Tolerating diet  Outcome: Resolved/Met  Goal: *Verbalizes understanding and describes prescribed diet  Outcome: Resolved/Met  Goal: *Describes blood glucose goals, monitoring, sick day rules, hypo/hyperglycemia  Outcome: Resolved/Met  Goal: *Describes available resources and support systems  Outcome: Resolved/Met  Goal: *Verbalizes name, dosage, time, side effects, and number of days to continue medications  Outcome: Resolved/Met  Goal: *Demonstrates ability to self-administer insulin  Outcome: Resolved/Met     Problem: Pain  Goal: *Control of Pain  Outcome: Resolved/Met  Goal: *PALLIATIVE CARE:  Alleviation of Pain  Outcome: Resolved/Met     Problem: Patient Education: Go to Patient Education Activity  Goal: Patient/Family Education  Outcome: Resolved/Met     Problem: Pancreatitis  Goal: *Control of acute pain  Outcome: Resolved/Met  Goal: *Absence of nausea/vomiting  Outcome: Resolved/Met  Goal: *Optimize nutritional status  Outcome: Resolved/Met  Goal: *Labs within defined limits  Outcome: Resolved/Met     Problem: Patient Education: Go to Patient Education Activity  Goal: Patient/Family Education  Outcome: Resolved/Met     Problem: Diabetes Self-Management  Goal: *Disease process and treatment process  Description: Define diabetes and identify own type of diabetes; list 3 options for treating diabetes. Outcome: Resolved/Met  Goal: *Incorporating nutritional management into lifestyle  Description: Describe effect of type, amount and timing of food on blood glucose; list 3 methods for planning meals. Outcome: Resolved/Met  Goal: *Incorporating physical activity into lifestyle  Description: State effect of exercise on blood glucose levels. Outcome: Resolved/Met  Goal: *Developing strategies to promote health/change behavior  Description: Define the ABC's of diabetes; identify appropriate screenings, schedule and personal plan for screenings. Outcome: Resolved/Met  Goal: *Using medications safely  Description: State effect of diabetes medications on diabetes; name diabetes medication taking, action and side effects. Outcome: Resolved/Met  Goal: *Monitoring blood glucose, interpreting and using results  Description: Identify recommended blood glucose targets  and personal targets. Outcome: Resolved/Met  Goal: *Prevention, detection, treatment of acute complications  Description: List symptoms of hyper- and hypoglycemia; describe how to treat low blood sugar and actions for lowering  high blood glucose level. Outcome: Resolved/Met  Goal: *Prevention, detection and treatment of chronic complications  Description: Define the natural course of diabetes and describe the relationship of blood glucose levels to long term complications of diabetes.   Outcome: Resolved/Met  Goal: *Developing strategies to address psychosocial issues  Description: Describe feelings about living with diabetes; identify support needed and support network  Outcome: Resolved/Met  Goal: *Insulin pump training  Outcome: Resolved/Met  Goal: *Sick day guidelines  Outcome: Resolved/Met  Goal: *Patient Specific Goal (EDIT GOAL, INSERT TEXT)  Outcome: Resolved/Met     Problem: Patient Education: Go to Patient Education Activity  Goal: Patient/Family Education  Outcome: Resolved/Met

## 2022-08-14 NOTE — DISCHARGE INSTRUCTIONS
DISCHARGE DIAGNOSIS:  Acute uncomplicated pancreatitis, POA  SIRS positive symptoms  -DKA, POA  High anion gap metabolic acidosis  Pseudohyponatremia from hyperglycemia  -      MEDICATIONS:  It is important that you take the medication exactly as they are prescribed. Keep your medication in the bottles provided by the pharmacist and keep a list of the medication names, dosages, and times to be taken in your wallet. Do not take other medications without consulting your doctor. Pain Management: per above medications    What to do at Home    Recommended diet:  Diabetic Diet    Recommended activity: Activity as tolerated    If you have questions regarding the hospital related prescriptions or hospital related issues please call [address] [place] [address] at . You can always direct your questions to your primary care doctor if you are unable to reach your hospital physician; your PCP works as an extension of your hospital doctor just like your hospital doctor is an extension of your PCP for your time at the hospital [facility]).     If you experience any of the following symptoms then please call your primary care physician or return to the emergency room if you cannot get hold of your doctor:  Fever, chills, nausea, vomiting, diarrhea, change in mentation, falling, bleeding, shortness of breath,

## 2022-08-14 NOTE — PROGRESS NOTES
End of Shift Note    Bedside shift change report given to Troy Rosario (oncoming nurse) by Elmo Morales RN (offgoing nurse). Report included the following information SBAR, Kardex, Intake/Output, MAR, and Recent Results    Shift worked:  [id number]     Shift summary and any significant changes:     No significant changes. Pt requested PRN morphine x1 for pain. States \"it's time to make some changes to my diet, this is miserable. \"     Concerns for physician to address:  Pain management     Zone phone for oncoming shift:              Elmo Morales RN

## 2022-08-18 LAB
BACTERIA SPEC CULT: NORMAL
SERVICE CMNT-IMP: NORMAL

## 2022-08-24 NOTE — DISCHARGE SUMMARY
Hospitalist  Discharge Summary     Patient ID:  Jose Hicks  [id number]  48 y.o.  1975 8/13/2022    PCP on record: None    Admit date: 8/13/2022  Discharge date and time: 08/14    DISCHARGE DIAGNOSIS:  Acute uncomplicated pancreatitis  Alcohol abuse  SIRS   DKA   Pseudohyponatremia        CONSULTATIONS:  None    Excerpted HPI from H&P of Diego Karimi MD:  CHIEF COMPLAINT: Abdominal pain     HISTORY OF PRESENT ILLNESS:        The patient is 48years old man with past medical history significant for diabetes mellitus, alcohol induced pancreatitis presented to the emergency department at [facility] with abdominal pain, nausea and vomiting. His initial labs were consistent with DKA. CT abdomen revealed acute uncomplicated pancreatitis for which patient was transferred to [facility].  No upon arrival to [facility], patient was hemodynamically stable. He still complaining from some nausea but no vomiting reported. He is also complaining from some abdominal pain. We were asked to admit for work up and evaluation of the above problems. ______________________________________________________________________  DISCHARGE SUMMARY/HOSPITAL COURSE:  for full details see H&P, daily progress notes, labs, consult notes. This is a 69-year-old male with history of alcohol abuse, diabetes mellitus who was admitted for acute pancreatitis, also was found to have DKA. Was treated with n.p.o., IV fluid and insulin drip. His anion gap closed, was transitioned to subcutaneous insulin  Patient was able to tolerate diet. He left AMA prior to discharge  Patient was clinically stable for discharge      _______________________________________________________________________  Patient seen and examined by me on discharge day. Pertinent Findings:  Gen:    Not in distress  Chest: Clear lungs  CVS:   Regular rhythm.   No edema  Abd:  Soft, not distended, not tender  Neuro:  Alert, oriented x3  _______________________________________________________________________  DISCHARGE MEDICATIONS:   Discharge Medication List as of 8/14/2022  1:44 PM        START taking these medications    Details   glipiZIDE (GLUCOTROL) 5 mg tablet Take 0.5 Tablets by mouth two (2) times a day for 30 days. , Normal, Disp-30 Tablet, R-0           CONTINUE these medications which have NOT CHANGED    Details   nystatin (MYCOSTATIN) powder Apply  to affected area three (3) times daily. , Normal, Disp-60 g, R-0      clotrimazole (LOTRIMIN) 1 % topical cream Apply  to affected area two (2) times a day for 30 days. Apply twice a day for 2 weeks or for 2 days after  rash resolves, Normal, Disp-60 g, R-0      ibuprofen (MOTRIN) 600 mg tablet Take 1 Tablet by mouth every six (6) hours as needed for Pain., Normal, Disp-20 Tablet, R-0      diphenoxylate-atropine (LomotiL) 2.5-0.025 mg per tablet Take 1 Tab by mouth four (4) times daily as needed for Diarrhea (1 tab after each stool for max 8 per day). Max Daily Amount: 4 Tabs. Take after each stool for a maximum of 8 tablets daily, Normal, Disp-20 Tab, R-0      promethazine (PHENERGAN) 25 mg tablet Take 1 Tab by mouth every six (6) hours as needed for Nausea for up to 20 doses. , Normal, Disp-12 Tab, R-0      naloxone (NARCAN) 4 mg/actuation nasal spray Use 1 spray intranasally, then discard. Repeat with new spray every 2 min as needed for opioid overdose symptoms, alternating nostrils. , Print, Disp-1 Each, R-1      lisinopril (PRINIVIL, ZESTRIL) 5 mg tablet Take 1 Tab by mouth daily. , Print, Disp-30 Tab, R-0      metFORMIN (GLUCOPHAGE) 500 mg tablet Take 2 Tabs by mouth two (2) times daily (with meals). , Print, Disp-60 Tab, R-0      ondansetron (ZOFRAN ODT) 4 mg disintegrating tablet Take 1 Tab by mouth every eight (8) hours as needed for Nausea. , Print, Disp-8 Tab, R-0               Patient Follow Up Instructions:    Activity: Activity as tolerated  Diet: Diabetic Diet  Wound Care: None needed      Follow-up Information       Follow up With Specialties Details Why Contact Info    None    None ([phone]) Patient stated that they have no PCP            ________________________________________________________________    Risk of deterioration: Low    Condition at Discharge:  Stable  __________________________________________________________________    Disposition  Home with family, no needs but patient left AMA    ____________________________________________________________________    Code Status: Full Code  ___________________________________________________________________      Total time in minutes spent coordinating this discharge (includes going over instructions, follow-up, prescriptions, and preparing report for sign off to her PCP) :  >30 minutes    Signed:  Kanu Antonio MD

## 2023-05-16 RX ORDER — DIPHENOXYLATE HYDROCHLORIDE AND ATROPINE SULFATE 2.5; .025 MG/1; MG/1
1 TABLET ORAL 4 TIMES DAILY PRN
COMMUNITY
Start: 2020-07-07

## 2023-05-16 RX ORDER — LISINOPRIL 5 MG/1
5 TABLET ORAL DAILY
COMMUNITY
Start: 2020-02-11

## 2023-05-16 RX ORDER — IBUPROFEN 600 MG/1
600 TABLET ORAL EVERY 6 HOURS PRN
COMMUNITY
Start: 2022-07-26

## 2023-05-16 RX ORDER — ONDANSETRON 4 MG/1
4 TABLET, ORALLY DISINTEGRATING ORAL EVERY 8 HOURS PRN
COMMUNITY
Start: 2019-10-14

## 2023-05-16 RX ORDER — NALOXONE HYDROCHLORIDE 4 MG/.1ML
SPRAY NASAL
COMMUNITY
Start: 2020-02-16

## 2023-05-16 RX ORDER — NYSTATIN 100000 [USP'U]/G
POWDER TOPICAL 3 TIMES DAILY
COMMUNITY
Start: 2022-08-09

## 2023-05-16 RX ORDER — PROMETHAZINE HYDROCHLORIDE 25 MG/1
25 TABLET ORAL EVERY 6 HOURS PRN
COMMUNITY
Start: 2020-07-07